# Patient Record
Sex: FEMALE | Race: BLACK OR AFRICAN AMERICAN | Employment: FULL TIME | URBAN - METROPOLITAN AREA
[De-identification: names, ages, dates, MRNs, and addresses within clinical notes are randomized per-mention and may not be internally consistent; named-entity substitution may affect disease eponyms.]

---

## 2018-08-29 ENCOUNTER — APPOINTMENT (OUTPATIENT)
Dept: CT IMAGING | Age: 43
End: 2018-08-29
Attending: EMERGENCY MEDICINE
Payer: OTHER GOVERNMENT

## 2018-08-29 ENCOUNTER — HOSPITAL ENCOUNTER (EMERGENCY)
Age: 43
Discharge: HOME | End: 2018-08-29
Attending: STUDENT IN AN ORGANIZED HEALTH CARE EDUCATION/TRAINING PROGRAM
Payer: OTHER GOVERNMENT

## 2018-08-29 ENCOUNTER — APPOINTMENT (OUTPATIENT)
Dept: GENERAL RADIOLOGY | Age: 43
End: 2018-08-29
Attending: EMERGENCY MEDICINE
Payer: OTHER GOVERNMENT

## 2018-08-29 VITALS
RESPIRATION RATE: 16 BRPM | BODY MASS INDEX: 28.51 KG/M2 | HEIGHT: 66 IN | OXYGEN SATURATION: 100 % | WEIGHT: 177.4 LBS | HEART RATE: 75 BPM | SYSTOLIC BLOOD PRESSURE: 117 MMHG | DIASTOLIC BLOOD PRESSURE: 84 MMHG | TEMPERATURE: 97.9 F

## 2018-08-29 DIAGNOSIS — R07.9 CHEST PAIN, UNSPECIFIED TYPE: Primary | ICD-10-CM

## 2018-08-29 LAB
ALBUMIN SERPL-MCNC: 3.7 G/DL (ref 3.5–5)
ALBUMIN/GLOB SERPL: 0.9 {RATIO} (ref 1.1–2.2)
ALP SERPL-CCNC: 47 U/L (ref 45–117)
ALT SERPL-CCNC: 13 U/L (ref 12–78)
ANION GAP SERPL CALC-SCNC: 10 MMOL/L (ref 5–15)
APPEARANCE UR: ABNORMAL
AST SERPL-CCNC: 10 U/L (ref 15–37)
BACTERIA URNS QL MICRO: ABNORMAL /HPF
BASOPHILS # BLD: 0 K/UL (ref 0–0.1)
BASOPHILS NFR BLD: 0 % (ref 0–1)
BILIRUB SERPL-MCNC: 1.5 MG/DL (ref 0.2–1)
BILIRUB UR QL CFM: NEGATIVE
BUN SERPL-MCNC: 13 MG/DL (ref 6–20)
BUN/CREAT SERPL: 16 (ref 12–20)
CALCIUM SERPL-MCNC: 8.8 MG/DL (ref 8.5–10.1)
CHLORIDE SERPL-SCNC: 103 MMOL/L (ref 97–108)
CO2 SERPL-SCNC: 27 MMOL/L (ref 21–32)
COLOR UR: ABNORMAL
COMMENT, HOLDF: NORMAL
CREAT SERPL-MCNC: 0.79 MG/DL (ref 0.55–1.02)
D DIMER PPP FEU-MCNC: 3.41 MG/L FEU (ref 0–0.65)
DIFFERENTIAL METHOD BLD: NORMAL
EOSINOPHIL # BLD: 0.1 K/UL (ref 0–0.4)
EOSINOPHIL NFR BLD: 1 % (ref 0–7)
EPITH CASTS URNS QL MICRO: ABNORMAL /LPF
ERYTHROCYTE [DISTWIDTH] IN BLOOD BY AUTOMATED COUNT: 12.3 % (ref 11.5–14.5)
GLOBULIN SER CALC-MCNC: 4.1 G/DL (ref 2–4)
GLUCOSE SERPL-MCNC: 101 MG/DL (ref 65–100)
GLUCOSE UR STRIP.AUTO-MCNC: NEGATIVE MG/DL
HCT VFR BLD AUTO: 40.4 % (ref 35–47)
HGB BLD-MCNC: 13.7 G/DL (ref 11.5–16)
HGB UR QL STRIP: NEGATIVE
IMM GRANULOCYTES # BLD: 0 K/UL (ref 0–0.04)
IMM GRANULOCYTES NFR BLD AUTO: 0 % (ref 0–0.5)
KETONES UR QL STRIP.AUTO: 15 MG/DL
LEUKOCYTE ESTERASE UR QL STRIP.AUTO: NEGATIVE
LIPASE SERPL-CCNC: 102 U/L (ref 73–393)
LYMPHOCYTES # BLD: 0.8 K/UL (ref 0.8–3.5)
LYMPHOCYTES NFR BLD: 18 % (ref 12–49)
MCH RBC QN AUTO: 32.4 PG (ref 26–34)
MCHC RBC AUTO-ENTMCNC: 33.9 G/DL (ref 30–36.5)
MCV RBC AUTO: 95.5 FL (ref 80–99)
MONOCYTES # BLD: 0.6 K/UL (ref 0–1)
MONOCYTES NFR BLD: 13 % (ref 5–13)
MUCOUS THREADS URNS QL MICRO: ABNORMAL /LPF
NEUTS SEG # BLD: 3 K/UL (ref 1.8–8)
NEUTS SEG NFR BLD: 67 % (ref 32–75)
NITRITE UR QL STRIP.AUTO: NEGATIVE
NRBC # BLD: 0 K/UL (ref 0–0.01)
NRBC BLD-RTO: 0 PER 100 WBC
PH UR STRIP: 5.5 [PH] (ref 5–8)
PLATELET # BLD AUTO: 230 K/UL (ref 150–400)
PMV BLD AUTO: 9.7 FL (ref 8.9–12.9)
POTASSIUM SERPL-SCNC: 3.2 MMOL/L (ref 3.5–5.1)
PROT SERPL-MCNC: 7.8 G/DL (ref 6.4–8.2)
PROT UR STRIP-MCNC: 30 MG/DL
RBC # BLD AUTO: 4.23 M/UL (ref 3.8–5.2)
RBC #/AREA URNS HPF: ABNORMAL /HPF (ref 0–5)
SAMPLES BEING HELD,HOLD: NORMAL
SODIUM SERPL-SCNC: 140 MMOL/L (ref 136–145)
SP GR UR REFRACTOMETRY: >1.03 (ref 1–1.03)
TROPONIN I SERPL-MCNC: <0.05 NG/ML
UR CULT HOLD, URHOLD: NORMAL
UROBILINOGEN UR QL STRIP.AUTO: 1 EU/DL (ref 0.2–1)
WBC # BLD AUTO: 4.5 K/UL (ref 3.6–11)
WBC URNS QL MICRO: ABNORMAL /HPF (ref 0–4)

## 2018-08-29 PROCEDURE — 80053 COMPREHEN METABOLIC PANEL: CPT | Performed by: NURSE PRACTITIONER

## 2018-08-29 PROCEDURE — 99284 EMERGENCY DEPT VISIT MOD MDM: CPT

## 2018-08-29 PROCEDURE — 84484 ASSAY OF TROPONIN QUANT: CPT | Performed by: NURSE PRACTITIONER

## 2018-08-29 PROCEDURE — 85025 COMPLETE CBC W/AUTO DIFF WBC: CPT | Performed by: NURSE PRACTITIONER

## 2018-08-29 PROCEDURE — 36415 COLL VENOUS BLD VENIPUNCTURE: CPT | Performed by: NURSE PRACTITIONER

## 2018-08-29 PROCEDURE — 71275 CT ANGIOGRAPHY CHEST: CPT

## 2018-08-29 PROCEDURE — 85379 FIBRIN DEGRADATION QUANT: CPT | Performed by: STUDENT IN AN ORGANIZED HEALTH CARE EDUCATION/TRAINING PROGRAM

## 2018-08-29 PROCEDURE — 81001 URINALYSIS AUTO W/SCOPE: CPT | Performed by: NURSE PRACTITIONER

## 2018-08-29 PROCEDURE — 93005 ELECTROCARDIOGRAM TRACING: CPT

## 2018-08-29 PROCEDURE — 74011000258 HC RX REV CODE- 258: Performed by: STUDENT IN AN ORGANIZED HEALTH CARE EDUCATION/TRAINING PROGRAM

## 2018-08-29 PROCEDURE — 74011636320 HC RX REV CODE- 636/320: Performed by: STUDENT IN AN ORGANIZED HEALTH CARE EDUCATION/TRAINING PROGRAM

## 2018-08-29 PROCEDURE — 71046 X-RAY EXAM CHEST 2 VIEWS: CPT

## 2018-08-29 PROCEDURE — 83690 ASSAY OF LIPASE: CPT | Performed by: NURSE PRACTITIONER

## 2018-08-29 RX ORDER — SODIUM CHLORIDE 0.9 % (FLUSH) 0.9 %
10 SYRINGE (ML) INJECTION
Status: COMPLETED | OUTPATIENT
Start: 2018-08-29 | End: 2018-08-29

## 2018-08-29 RX ADMIN — SODIUM CHLORIDE 100 ML: 900 INJECTION, SOLUTION INTRAVENOUS at 19:44

## 2018-08-29 RX ADMIN — Medication 10 ML: at 19:44

## 2018-08-29 RX ADMIN — IOPAMIDOL 100 ML: 755 INJECTION, SOLUTION INTRAVENOUS at 19:44

## 2018-08-29 NOTE — ED TRIAGE NOTES
Pt complains of chest pain described as a tightness that started today. States she took a vehicle ride to [place] and returned today. States \"it's hard to breath\" but denies shortness of breath.

## 2018-08-29 NOTE — BSMART NOTE
Was asked to see this pt today. Pt reports that she is very stressed out by her 13year old daughter who has been psychiatrically admitted on 3 occasions over the past one year. Pt states that she is constantly worried about her and feels like \"I walk on egg shells around her. \"  Pt works in [address] [place] [address] and lives with her mother so her daughter is often cared for by grandmother when pt is at work. Pt. States that her daughter suffers from depression and anxiety. Pt is  from daughter's father and he lives in [place]. Pt seemed to benefit from talking to this evaluator. Pt sees a therapist on Saturdays and likes this time to herself to \"vent. \"  Pt is not reporting any acute symptoms at this time. Pt will be discharged once medically cleared. Rigoberto Campos LCSW

## 2018-08-29 NOTE — ED NOTES
 
 
39year old female who presents with complaints of chest tightness since this afternoon after riding to [place] today. Pt reports difficulty taking a deep breath.

## 2018-08-29 NOTE — ED TRIAGE NOTES
When asked about depression for SAD scale pt becomes tearful and admits to depression but denies SI. Would like to speak to [facility].

## 2018-08-29 NOTE — ED PROVIDER NOTES
HPI Comments: 39 y.o. female with past medical history significant for MS who presents from work via private vehicle with chief complaint of chest pain. Pt reports sudden onset L sided nonradiating chest pain described as \"pressure, tightness, and heaviness\" while driving home from work in [place] at 1500 this afternoon with accompanying SOB. Pt states she currently still has the pain, unchanged with exertion or deep inspiration. Pt reports a history of MS, denies any increased myalgias from baseline. Pt denies any FMHx of early CAD/MI. Pt denies any recent procedures, denies birth control use. Pt denies any dizziness, lightheadedness, nausea, vomiting, or leg swelling. There are no other acute medical concerns at this time. Social hx: Nonsmoker; No EtOH use PCP: Naif Wasserman NP Note written by Giuseppe Chris, as dictated by Marie Alejo PA-C 5:58 PM 
 
The history is provided by the patient. No  was used. Past Medical History:  
Diagnosis Date  MS (multiple sclerosis) ([facility] [address] Past Surgical History:  
Procedure Laterality Date  HX ORTHOPAEDIC    
 bilat feet  HX PARTIAL HYSTERECTOMY History reviewed. No pertinent family history. Social History Social History  Marital status:  Spouse name: N/A  
 Number of children: N/A  
 Years of education: N/A Occupational History  Not on file. Social History Main Topics  Smoking status: Never Smoker  Smokeless tobacco: Never Used  Alcohol use No  
 Drug use: No  
 Sexual activity: Not on file Other Topics Concern  Not on file Social History Narrative ALLERGIES: Review of patient's allergies indicates no known allergies. Review of Systems Constitutional: Negative for chills and fever. Respiratory: Positive for shortness of breath. Cardiovascular: Positive for chest pain. Negative for leg swelling. Gastrointestinal: Negative for nausea and vomiting. Genitourinary: Negative for difficulty urinating and dysuria. Musculoskeletal: Negative for back pain and neck pain. Skin: Negative for color change and rash. Neurological: Negative for dizziness and light-headedness. All other systems reviewed and are negative. Vitals:  
 08/29/18 1645 BP: 112/73 Pulse: 93 Resp: 18 Temp: 97.6 °F (36.4 °C) SpO2: 97% Weight: 80.5 kg (177 lb 6.4 oz) Height: 5' 6\" (1.676 m) Physical Exam  
Constitutional: She is oriented to person, place, and time. She appears well-developed and well-nourished. No distress. HENT:  
Head: Normocephalic and atraumatic. Eyes: Conjunctivae and EOM are normal. Pupils are equal, round, and reactive to light. Neck: Normal range of motion. Neck supple. Cardiovascular: Normal rate, regular rhythm, S1 normal, S2 normal and intact distal pulses. No murmur heard. Pulses: 
     Radial pulses are 2+ on the right side, and 2+ on the left side. Posterior tibial pulses are 2+ on the right side, and 2+ on the left side. Pulmonary/Chest: Effort normal and breath sounds normal. No respiratory distress. She has no wheezes. She exhibits no tenderness. Abdominal: Soft. Normal aorta and bowel sounds are normal. She exhibits no distension, no abdominal bruit, no pulsatile midline mass and no mass. There is no hepatosplenomegaly, splenomegaly or hepatomegaly. There is no tenderness. There is no rebound and no guarding. No hernia. Hernia confirmed negative in the ventral area. Abdomen exposed for exam. No pain with light or deep palpation. Musculoskeletal: Normal range of motion. She exhibits no edema or tenderness. Calves nontender to palpation bilaterally. No cords, negative homans sign Lymphadenopathy:  
  She has no cervical adenopathy. Neurological: She is alert and oriented to person, place, and time.  No cranial nerve deficit. She exhibits normal muscle tone. Coordination normal.  
Skin: Skin is warm and dry. No rash noted. No erythema. Psychiatric: She has a normal mood and affect. Judgment and thought content normal.  
Nursing note and vitals reviewed. MDM Number of Diagnoses or Management Options Diagnosis management comments: 53-year-old female presenting for chest pain starting this afternoon. Sensation of needing to catch her breath but no shortness of breath. No nausea or vomiting. No family history of heart disease. Lungs are clear bilaterally. She is nontoxic appearing. No calf pain on exam. 
Plan: EKG, chest x-ray, labs, dispo based on results 7:45 PM 
Ddimer elevated. Discussed with patient. Will get CTA chest 
 
8:21 PM 
Pt reevaluated. Pt feeling better. . It is felt that the intermittent pain the patient was experiencing is consistent with non cardiac pain. There are no associated symptoms, nor is the pain exertional. The ECG is normal, as are the vital signs. The  Precautionary enzymes as well as CT were normal. The patient had a good response to aspirin this morning. I discussed my findings with the patient, including the uncertainties of any chest pain diagnosis. I gave strict return and follow up instructions Patient's results have been reviewed with them. Patient and/or family have verbally conveyed their understanding and agreement of the patient's signs, symptoms, diagnosis, treatment and prognosis and additionally agree to follow up as recommended or return to the Emergency Room should their condition change prior to follow-up. Discharge instructions have also been provided to the patient with some educational information regarding their diagnosis as well a list of reasons why they would want to return to the ER prior to their follow-up appointment should their condition change. Amount and/or Complexity of Data Reviewed Discuss the patient with other providers: yes (ER Attending-Flynn) Patient Progress Patient progress: stable ED Course Procedures 5:43 PM 
Attempted to see the ptJARED at bedside. ED EKG interpretation: 
Rhythm: normal sinus rhythm; and regular . Rate (approx.): 85; Axis: normal; ST/T wave: no acute st changes Pt case including HPI, PE, and all available lab and radiology results has been discussed with attending physician. Opportunity to evaluate patient has been provided to ER attending. Discharge and prescription plan has been agreed upon.

## 2018-08-30 LAB
ATRIAL RATE: 86 BPM
CALCULATED P AXIS, ECG09: 69 DEGREES
CALCULATED R AXIS, ECG10: 56 DEGREES
CALCULATED T AXIS, ECG11: 33 DEGREES
DIAGNOSIS, 93000: NORMAL
P-R INTERVAL, ECG05: 136 MS
Q-T INTERVAL, ECG07: 368 MS
QRS DURATION, ECG06: 92 MS
QTC CALCULATION (BEZET), ECG08: 440 MS
VENTRICULAR RATE, ECG03: 86 BPM

## 2018-08-30 NOTE — DISCHARGE INSTRUCTIONS
Chest Pain: Care Instructions  Your Care Instructions    There are many things that can cause chest pain. Some are not serious and will get better on their own in a few days. But some kinds of chest pain need more testing and treatment. Your doctor may have recommended a follow-up visit in the next 8 to 12 hours. If you are not getting better, you may need more tests or treatment. Even though your doctor has released you, you still need to watch for any problems. The doctor carefully checked you, but sometimes problems can develop later. If you have new symptoms or if your symptoms do not get better, get medical care right away. If you have worse or different chest pain or pressure that lasts more than 5 minutes or you passed out (lost consciousness), call 911 or seek other emergency help right away. A medical visit is only one step in your treatment. Even if you feel better, you still need to do what your doctor recommends, such as going to all suggested follow-up appointments and taking medicines exactly as directed. This will help you recover and help prevent future problems. How can you care for yourself at home? · Rest until you feel better. · Take your medicine exactly as prescribed. Call your doctor if you think you are having a problem with your medicine. · Do not drive after taking a prescription pain medicine. When should you call for help? Call 911 if:    · You passed out (lost consciousness).     · You have severe difficulty breathing.     · You have symptoms of a heart attack. These may include:  ¨ Chest pain or pressure, or a strange feeling in your chest.  ¨ Sweating. ¨ Shortness of breath. ¨ Nausea or vomiting. ¨ Pain, pressure, or a strange feeling in your back, neck, jaw, or upper belly or in one or both shoulders or arms. ¨ Lightheadedness or sudden weakness. ¨ A fast or irregular heartbeat.   After you call 911, the  may tell you to chew 1 adult-strength or 2 to 4 low-dose aspirin. Wait for an ambulance. Do not try to drive yourself.    Call your doctor today if:    · You have any trouble breathing.     · Your chest pain gets worse.     · You are dizzy or lightheaded, or you feel like you may faint.     · You are not getting better as expected.     · You are having new or different chest pain. Where can you learn more? Go to http://jr-coleman.info/. Enter A120 in the search box to learn more about \"Chest Pain: Care Instructions. \"  Current as of: November 20, 2017  Content Version: 11.7  © 2325-7385 [employer]. Care instructions adapted under license by [employer] (which disclaims liability or warranty for this information). If you have questions about a medical condition or this instruction, always ask your healthcare professional. [address] any warranty or liability for your use of this information.

## 2018-08-30 NOTE — ED NOTES
Discharge instructions given to pt by MD and RN . Pt has been given counseling on med use and verbalizes  understanding. Pt ambulated off unit with no signs of distress.

## 2019-04-22 ENCOUNTER — OFFICE VISIT (OUTPATIENT)
Dept: URGENT CARE | Age: 44
End: 2019-04-22

## 2019-04-22 VITALS
BODY MASS INDEX: 27.16 KG/M2 | HEART RATE: 91 BPM | HEIGHT: 66 IN | WEIGHT: 169 LBS | DIASTOLIC BLOOD PRESSURE: 59 MMHG | TEMPERATURE: 98.7 F | RESPIRATION RATE: 16 BRPM | OXYGEN SATURATION: 98 % | SYSTOLIC BLOOD PRESSURE: 120 MMHG

## 2019-04-22 DIAGNOSIS — R21 RASH AND NONSPECIFIC SKIN ERUPTION: Primary | ICD-10-CM

## 2019-04-22 RX ORDER — VALACYCLOVIR HYDROCHLORIDE 500 MG/1
500 TABLET, FILM COATED ORAL
COMMUNITY
Start: 2018-09-14 | End: 2019-04-22

## 2019-04-22 RX ORDER — PREDNISONE 10 MG/1
TABLET ORAL
Qty: 21 TAB | Refills: 0 | Status: SHIPPED | OUTPATIENT
Start: 2019-04-22 | End: 2019-07-08 | Stop reason: ALTCHOICE

## 2019-04-22 RX ORDER — VALACYCLOVIR HYDROCHLORIDE 1 G/1
1000 TABLET, FILM COATED ORAL 2 TIMES DAILY
Qty: 14 TAB | Refills: 0 | Status: SHIPPED | OUTPATIENT
Start: 2019-04-22 | End: 2019-07-08 | Stop reason: ALTCHOICE

## 2019-04-22 NOTE — PROGRESS NOTES
Rash    The history is provided by the patient. This is a new problem. The current episode started 12 to 24 hours ago. The problem has not changed since onset. The problem is associated with an unknown factor. There has been no fever. The rash is present on the neck (posterior on mid cervical spine area). The pain is moderate. The pain has been constant since onset. Associated symptoms include itching and pain. Risk factors: none. She has tried nothing for the symptoms. Past Medical History:   Diagnosis Date    MS (multiple sclerosis) ([facility] [address]         Past Surgical History:   Procedure Laterality Date    HX ORTHOPAEDIC      bilat feet    HX PARTIAL HYSTERECTOMY           History reviewed. No pertinent family history.      Social History     Socioeconomic History    Marital status:      Spouse name: Not on file    Number of children: Not on file    Years of education: Not on file    Highest education level: Not on file   Occupational History    Not on file   Social Needs    Financial resource strain: Not on file    Food insecurity:     Worry: Not on file     Inability: Not on file    Transportation needs:     Medical: Not on file     Non-medical: Not on file   Tobacco Use    Smoking status: Never Smoker    Smokeless tobacco: Never Used   Substance and Sexual Activity    Alcohol use: No    Drug use: No    Sexual activity: Not on file   Lifestyle    Physical activity:     Days per week: Not on file     Minutes per session: Not on file    Stress: Not on file   Relationships    Social connections:     Talks on phone: Not on file     Gets together: Not on file     Attends Jehovah's witness service: Not on file     Active member of club or organization: Not on file     Attends meetings of clubs or organizations: Not on file     Relationship status: Not on file    Intimate partner violence:     Fear of current or ex partner: Not on file     Emotionally abused: Not on file     Physically abused: Not on file     Forced sexual activity: Not on file   Other Topics Concern    Not on file   Social History Narrative    Not on file                ALLERGIES: Patient has no known allergies. Review of Systems   Skin: Positive for itching and rash. All other systems reviewed and are negative. Vitals:    04/22/19 1835   BP: 120/59   Pulse: 91   Resp: 16   Temp: 98.7 °F (37.1 °C)   SpO2: 98%   Weight: 169 lb (76.7 kg)   Height: 5' 6\" (1.676 m)       Physical Exam   Constitutional: No distress. Skin: Lesion (erythematous - painful and hypersensitive - lesions on cervical area) noted. Nursing note and vitals reviewed. MDM    Procedures        ICD-10-CM ICD-9-CM    1. Rash and nonspecific skin eruption R21 782.1     cervical spine area in midline- painful - local irritation and paresthesia- ? herpes zoster        Medications Ordered Today   Medications    valACYclovir (VALTREX) 1 gram tablet     Sig: Take 1 Tab by mouth two (2) times a day. Dispense:  14 Tab     Refill:  0    predniSONE (STERAPRED DS) 10 mg dose pack     Sig: As directed     Dispense:  21 Tab     Refill:  0     No results found for any visits on 04/22/19. The patients condition was discussed with the patient and they understand. The patient is to follow up with primary care doctor. If signs and symptoms become worse the pt is to go to the ER. The patient is to take medications as prescribed.

## 2019-04-22 NOTE — PATIENT INSTRUCTIONS

## 2019-04-23 ENCOUNTER — OFFICE VISIT (OUTPATIENT)
Dept: FAMILY MEDICINE CLINIC | Age: 44
End: 2019-04-23

## 2019-04-23 VITALS
WEIGHT: 167.1 LBS | TEMPERATURE: 98.8 F | HEIGHT: 66 IN | RESPIRATION RATE: 20 BRPM | HEART RATE: 77 BPM | DIASTOLIC BLOOD PRESSURE: 74 MMHG | BODY MASS INDEX: 26.86 KG/M2 | OXYGEN SATURATION: 98 % | SYSTOLIC BLOOD PRESSURE: 116 MMHG

## 2019-04-23 DIAGNOSIS — Z13.1 DIABETES MELLITUS SCREENING: ICD-10-CM

## 2019-04-23 DIAGNOSIS — R23.8 RASH, VESICULAR: ICD-10-CM

## 2019-04-23 DIAGNOSIS — G35 MULTIPLE SCLEROSIS (HCC): ICD-10-CM

## 2019-04-23 DIAGNOSIS — Z01.84 IMMUNITY STATUS TESTING: ICD-10-CM

## 2019-04-23 DIAGNOSIS — B02.9 HERPES ZOSTER WITHOUT COMPLICATION: Primary | ICD-10-CM

## 2019-04-23 DIAGNOSIS — E55.9 VITAMIN D DEFICIENCY: ICD-10-CM

## 2019-04-23 DIAGNOSIS — Z13.220 LIPID SCREENING: ICD-10-CM

## 2019-04-23 DIAGNOSIS — D50.9 IRON DEFICIENCY ANEMIA, UNSPECIFIED IRON DEFICIENCY ANEMIA TYPE: ICD-10-CM

## 2019-04-23 DIAGNOSIS — Z76.89 ENCOUNTER TO ESTABLISH CARE: ICD-10-CM

## 2019-04-23 DIAGNOSIS — R53.83 FATIGUE, UNSPECIFIED TYPE: ICD-10-CM

## 2019-04-23 NOTE — PATIENT INSTRUCTIONS
Shingles: Care Instructions Your Care Instructions Shingles (herpes zoster) causes pain and a blistered rash. The rash can appear anywhere on the body but will be on only one side of the body, the left or right. It will be in a band, a strip, or a small area. The pain can be very severe. Shingles can also cause tingling or itching in the area of the rash. The blisters scab over after a few days and heal in 2 to 4 weeks. Medicines can help you feel better and may help prevent more serious problems caused by shingles. Shingles is caused by the same virus that causes chickenpox. When you have chickenpox, the virus gets into your nerve roots and stays there (becomes dormant) long after you get over the chickenpox. If the virus becomes active again, it can cause shingles. Follow-up care is a key part of your treatment and safety. Be sure to make and go to all appointments, and call your doctor if you are having problems. It's also a good idea to know your test results and keep a list of the medicines you take. How can you care for yourself at home? · Be safe with medicines. Take your medicines exactly as prescribed. Call your doctor if you think you are having a problem with your medicine. Antiviral medicine helps you get better faster. · Try not to scratch or pick at the blisters. They will crust over and fall off on their own if you leave them alone. · Put cool, wet cloths on the area to relieve pain and itching. You can also use calamine lotion. Try not to use so much lotion that it cakes and is hard to get off. · Put cornstarch or baking soda on the sores to help dry them out so they heal faster. · Do not use thick ointment, such as petroleum jelly, on the sores. This will keep them from drying and healing. · To help remove loose crusts, soak them in tap water. This can help decrease oozing, and dry and soothe the skin.  
· Take an over-the-counter pain medicine, such as acetaminophen (Tylenol), ibuprofen (Advil, Motrin), or naproxen (Aleve). Read and follow all instructions on the label. · Avoid close contact with people until the blisters have healed. It is very important for you to avoid contact with anyone who has never had chickenpox or the chickenpox vaccine. Pregnant women, young babies, and anyone else who has a hard time fighting infection (such as someone with HIV, diabetes, or cancer) is especially at risk. When should you call for help? Call your doctor now or seek immediate medical care if: 
  · You have a new or higher fever.  
  · You have a severe headache and a stiff neck.  
  · You lose the ability to think clearly.  
  · The rash spreads to your forehead, nose, eyes, or eyelids.  
  · You have eye pain, or your vision gets worse.  
  · You have new pain in your face, or you cannot move the muscles in your face.  
  · Blisters spread to new parts of your body.  
 Watch closely for changes in your health, and be sure to contact your doctor if: 
  · The rash has not healed after 2 to 4 weeks.  
  · You still have pain after the rash has healed. Where can you learn more? Go to http://jrGIGAScoleman.info/. Too Drain in the search box to learn more about \"Shingles: Care Instructions. \" Current as of: July 30, 2018 Content Version: 11.9 © 4712-0091 [employer]. Care instructions adapted under license by [email or web address] (which disclaims liability or warranty for this information). If you have questions about a medical condition or this instruction, always ask your healthcare professional. [place] [address] any warranty or liability for your use of this information. Multiple Sclerosis (MS): Care Instructions Your Care Instructions Multiple sclerosis, also called MS, is a disease that can affect the brain, spinal cord, and nerves to the eyes.  MS can cause problems with muscle control and strength, vision, balance, feeling, and thinking. Whatever your symptoms are, taking medicine correctly and following your doctor's advice for home care can help you maintain your quality of life. Follow-up care is a key part of your treatment and safety. Be sure to make and go to all appointments, and call your doctor if you are having problems. It's also a good idea to know your test results and keep a list of the medicines you take. How can you care for yourself at home? General care · Take your medicines exactly as prescribed. Call your doctor if you think you are having a problem with your medicine. · Use a cane, walker, or scooter if your doctor suggests it. · Keep doing your normal activities as much as you can. · If you have problems urinating, press or tap your bladder area to help start urine flow. If you have trouble controlling your urine, plan your fluid intake and activities so that a toilet will be available when you need it. · Spend time with family and friends. Join a support group for people with MS if you want extra help. · Depression is common with this condition. Tell your doctor if you have trouble sleeping, are eating too much or are not hungry, or feel sad or tearful all the time. Depression can be treated with medicine and counseling. Diet and exercise · Eat a balanced diet. · If you have problems swallowing, change how and what you eat: ? Try thick drinks, such as milk shakes. They are easier to swallow than other fluids. ? Do not eat foods that crumble easily. These can cause choking. ? Use a  to prepare food. Soft foods need less chewing. ? Eat small meals often so that you do not get tired from eating larger meals. · Get exercise on most days. Work with your doctor to set up a program of walking, swimming, or other exercise that you are able to do.  A physical therapist can teach you exercises if you cannot walk but can move your limbs and trunk. Or you can do exercises to help with coordination and balance. You can help improve muscle stiffness by doing exercises while lying in certain positions. When should you call for help? Call your doctor now or seek immediate medical care if: 
  · You have a change in symptoms.  
  · You fall or have another injury.  
  · You have symptoms of a urinary infection. For example: ? You have blood or pus in your urine. ? You have pain in your back just below your rib cage. This is called flank pain. ? You have a fever, chills, or body aches. ? It hurts to urinate. ? You have groin or belly pain.  
 Watch closely for changes in your health, and be sure to contact your doctor if: 
  · You want more information about MS or medicines.  
  · You have questions about alternative treatments. Do not use any other treatments without talking to your doctor first.  
Where can you learn more? Go to http://jr-coleman.info/. Enter K680 in the search box to learn more about \"Multiple Sclerosis (MS): Care Instructions. \" Current as of: Serina 3, 2018 Content Version: 11.9 © 9145-0660 [employer]. Care instructions adapted under license by [employer] (which disclaims liability or warranty for this information). If you have questions about a medical condition or this instruction, always ask your healthcare professional. [address] any warranty or liability for your use of this information.

## 2019-04-23 NOTE — PROGRESS NOTES
Chief Complaint Patient presents with [facility] Establish Care Rm 4  
 Skin Problem HPI: 
Belkys Tilley is a 39y.o. year old female who is a new patient and is here to establish care. She was previously followed by no one recent, 3 years ago saw [facility] with [facility]. Rash: 
Seen in [facility] urgent care last night for rash on her posterior cervical region of her back. She reports that the rash started yesterday morning. It is itchy, stinging pain. She was prescribed Prednisone and Valtrex but has not started these yet, would like confirmatory testing. The following sections were reviewed & updated as appropriate: PMH, PSH, PL, FMH,  and SH. Past Medical History:  
Diagnosis Date  MS (multiple sclerosis) ([facility] [address] Past Surgical History:  
Procedure Laterality Date  HX HYSTERECTOMY  02/2016  
 left ovaries  HX ORTHOPAEDIC  2001, 2003  
 bilat feet, bunions removed, bones removed from pinky Patient Active Problem List  
Diagnosis Code  Multiple sclerosis ([facility] [address] G35  
 Herpes zoster without complication U61.5  Rash, vesicular R23.8 Family History Problem Relation Age of Onset  Hypertension Mother  Elevated Lipids Mother  Diabetes Paternal Grandmother  Diabetes Paternal Grandfather Social History Socioeconomic History  Marital status:  Spouse name: Not on file  Number of children: Not on file  Years of education: Not on file  Highest education level: Not on file Occupational History  Not on file Social Needs  Financial resource strain: Not on file  Food insecurity:  
  Worry: Not on file Inability: Not on file  Transportation needs:  
  Medical: Not on file Non-medical: Not on file Tobacco Use  Smoking status: Never Smoker  Smokeless tobacco: Never Used Substance and Sexual Activity  Alcohol use: No  
 Drug use: No  
 Sexual activity: Not Currently Lifestyle  Physical activity:  
  Days per week: Not on file Minutes per session: Not on file  Stress: Not on file Relationships  Social connections:  
  Talks on phone: Not on file Gets together: Not on file Attends Yarsanism service: Not on file Active member of club or organization: Not on file Attends meetings of clubs or organizations: Not on file Relationship status: Not on file  Intimate partner violence:  
  Fear of current or ex partner: Not on file Emotionally abused: Not on file Physically abused: Not on file Forced sexual activity: Not on file Other Topics Concern  Not on file Social History Narrative  Not on file Prior to Admission medications Medication Sig Start Date End Date Taking? Authorizing Provider  
valACYclovir (VALTREX) 1 gram tablet Take 1 Tab by mouth two (2) times a day. 4/22/19  Yes Geoffrey Hendrickson MD  
[facility]) 10 mg dose pack As directed 4/22/19  Yes Geoffrey Hendrickson MD  
  
 
No Known Allergies Review of Systems A comprehensive review of systems was negative except for that written in the HPI. Objective: 
 
  
Visit Vitals /74 (BP 1 Location: Right arm, BP Patient Position: Sitting) Pulse 77 Temp 98.8 °F (37.1 °C) (Oral) Resp 20 Ht 5' 6\" (1.676 m) Wt 167 lb 1.6 oz (75.8 kg) SpO2 98% BMI 26.97 kg/m² Physical Exam 
Gen: alert, oriented, no acute distress Head: normocephalic, atraumatic Ears: external auditory canals clear, TMs without erythema or effusion Eyes: pupils equal round reactive to light, sclera clear, conjunctiva clear Oral: moist mucus membranes, no oral lesions, no pharyngeal inflammation or exudate Neck: symmetric normal sized thyroid, no carotid bruits, no jugular vein distention Resp: no increase work of breathing, lungs clear to ausculation bilaterally, no wheezing, rales or rhonchi CV: S1, S2 normal.  No murmurs, rubs, or gallops. Abd: soft, not tender, not distended. No hepatosplenomegaly. Normal bowel sounds. No hernias. No abdominal or renal bruits. Neuro: cranial nerves intact, normal strength and movement in all extremities, reflexes and sensation intact and symmetric. Skin: posterior cervical rash as above Extremities: no cyanosis or edema Assessment & Plan: ICD-10-CM ICD-9-CM 1. Herpes zoster without complication V99.2 932.2 VZV AB, IGG  
   VZV AB, IGM 2. Multiple sclerosis ([facility] [address] G35 340   
3. Encounter to establish care Z76.89 V65.8 URINALYSIS W/ RFLX MICROSCOPIC  
   LIPID PANEL  
   METABOLIC PANEL, COMPREHENSIVE  
   TSH 3RD GENERATION  
   VITAMIN D, 25 HYDROXY  
   HEMOGLOBIN A1C WITH EAG  
   CBC WITH AUTOMATED DIFF 4. Immunity status testing Z01.84 V72.61 RUBEOLA AB, IGG  
5. Rash, vesicular R23.8 709.8 HSV 1/2 AB, IGM  
   VZV AB, IGG  
   VZV AB, IGM 6. Iron deficiency anemia, unspecified iron deficiency anemia type D50.9 280.9 CBC WITH AUTOMATED DIFF 7. Diabetes mellitus screening Z13.1 V77.1 URINALYSIS W/ RFLX MICROSCOPIC METABOLIC PANEL, COMPREHENSIVE  
   HEMOGLOBIN A1C WITH EAG  
8. Fatigue, unspecified type R53.83 780.79 URINALYSIS W/ RFLX MICROSCOPIC  
   LIPID PANEL  
   METABOLIC PANEL, COMPREHENSIVE  
   TSH 3RD GENERATION  
   VITAMIN D, 25 HYDROXY  
   HEMOGLOBIN A1C WITH EAG  
   CBC WITH AUTOMATED DIFF 9. Lipid screening Z13.220 V77.91 LIPID PANEL 10. Vitamin D deficiency E55.9 268.9 VITAMIN D, 25 HYDROXY Follow-up and Dispositions · Return in about 1 month (around 5/21/2019) for Medication Check, Labs. 
  
 
lab results and schedule of future lab studies reviewed with patient - patient will return for annual fasting labs prior to her next appointment, immunity/cause of herpetic rash labs will be done today 
reviewed diet, exercise and weight control 
reviewed medications and side effects in detail - patient advised to start prednisone and valtrex as prescribed at this time. Differential diagnosis and treatment options reviewed with patient who is in agreement with treatment plan as outlined below. Health Maintenance reviewed - reviewed, Tdap received at [facility] in 2015, otherwise UTD. Recommended healthy diet low in carbohydrates, fats, sodium and cholesterol. Recommended regular cardiovascular exercise 3-6 times per week for 30-60 minutes daily. Chart is reviewed and updated today in the office. Records requested for other providers patient has seen and is currently seeing.  (Prescription Monitoring Program) report was run and reviewed today in the office. Patient was offered a choice/choices in the treatment plan today. Patient expresses understanding of the plan and agrees with recommendations. Verbal and written instructions (see AVS) provided. See patient instructions for more. Patient expresses understanding of diagnosis and treatment plan.

## 2019-04-23 NOTE — PROGRESS NOTES
Martin Patel  Identified pt with two pt identifiers(name and ). Chief Complaint Patient presents with [address] [place] [address] Rm 4  
 Skin Problem 1. Have you been to the ER, urgent care clinic since your last visit? Y - [address]  Hospitalized since your last visit? N 
 
2. Have you seen or consulted any other health care providers outside of the [address] [place] [address] since your last visit? Include any pap smears or colon screening. Advance Care Planning In the event something were to happen to you and you were unable to speak on your behalf, do you have an Advance Directive/ Living Will in place stating your wishes? NO If yes, do we have a copy on file NO If no, would you like information YES Medication reconciliation up to date and corrected with patient at this time. Today's provider has been notified of reason for visit, vitals and flowsheets obtained on patients. Reviewed record in preparation for visit, huddled with provider and have obtained necessary documentation. Health Maintenance Due Topic  DTaP/Tdap/Td series (1 - Tdap)  PAP AKA CERVICAL CYTOLOGY Wt Readings from Last 3 Encounters:  
19 167 lb 1.6 oz (75.8 kg) 19 169 lb (76.7 kg) 18 177 lb 6.4 oz (80.5 kg) Temp Readings from Last 3 Encounters:  
19 98.8 °F (37.1 °C) (Oral) 19 98.7 °F (37.1 °C)  
18 97.9 °F (36.6 °C) BP Readings from Last 3 Encounters:  
19 116/74  
19 120/59  
18 117/84 Pulse Readings from Last 3 Encounters:  
19 77  
19 91  
18 75 Vitals:  
 19 1344 BP: 116/74 Pulse: 77 Resp: 20 Temp: 98.8 °F (37.1 °C) TempSrc: Oral  
SpO2: 98% Weight: 167 lb 1.6 oz (75.8 kg) Height: 5' 6\" (1.676 m) PainSc:   2 PainLoc: Back Learning Assessment: 
:  
 
Learning Assessment 2019 PRIMARY LEARNER Patient HIGHEST LEVEL OF EDUCATION - PRIMARY LEARNER  SOME COLLEGE  
BARRIERS PRIMARY LEARNER NONE  
CO-LEARNER CAREGIVER No  
PRIMARY LANGUAGE ENGLISH  
LEARNER PREFERENCE PRIMARY DEMONSTRATION  
ANSWERED BY patient RELATIONSHIP SELF Depression Screening: 
:  
 
3 most recent PHQ Screens 4/23/2019 Little interest or pleasure in doing things Not at all Feeling down, depressed, irritable, or hopeless Not at all Total Score PHQ 2 0 No flowsheet data found. Fall Risk Assessment: 
:  
 
No flowsheet data found. Abuse Screening: 
:  
 
No flowsheet data found. ADL Screening: 
:  
 
ADL Assessment 4/23/2019 Feeding yourself No Help Needed Getting from bed to chair No Help Needed Getting dressed No Help Needed Bathing or showering No Help Needed Walk across the room (includes cane/walker) No Help Needed Using the telphone No Help Needed Taking your medications No Help Needed Preparing meals No Help Needed Managing money (expenses/bills) No Help Needed Moderately strenuous housework (laundry) No Help Needed Shopping for personal items (toiletries/medicines) No Help Needed Shopping for groceries No Help Needed Driving No Help Needed Climbing a flight of stairs No Help Needed Getting to places beyond walking distances No Help Needed BMI: 
Weight Metrics 4/23/2019 4/22/2019 8/29/2018 6/4/2016 Weight 167 lb 1.6 oz 169 lb 177 lb 6.4 oz 191 lb 6.4 oz BMI 26.97 kg/m2 27.28 kg/m2 28.63 kg/m2 30.91 kg/m2 Medication reconciliation up to date and corrected with patient at this time.

## 2019-04-24 DIAGNOSIS — Z13.220 LIPID SCREENING: ICD-10-CM

## 2019-04-24 DIAGNOSIS — Z13.1 DIABETES MELLITUS SCREENING: ICD-10-CM

## 2019-04-24 DIAGNOSIS — Z76.89 ENCOUNTER TO ESTABLISH CARE: ICD-10-CM

## 2019-04-24 DIAGNOSIS — R53.83 FATIGUE, UNSPECIFIED TYPE: ICD-10-CM

## 2019-04-24 DIAGNOSIS — E55.9 VITAMIN D DEFICIENCY: ICD-10-CM

## 2019-04-24 DIAGNOSIS — D50.9 IRON DEFICIENCY ANEMIA, UNSPECIFIED IRON DEFICIENCY ANEMIA TYPE: ICD-10-CM

## 2019-04-25 LAB
HSV1+2 IGM SER IA-ACNC: 1.56 RATIO (ref 0–0.9)
MEV IGG SER IA-ACNC: <25 AU/ML
VZV IGG SER IA-ACNC: >4000 INDEX
VZV IGM SER IA-ACNC: 1.02 INDEX (ref 0–0.9)

## 2019-04-29 NOTE — PROGRESS NOTES
Blood tests confirm diagnosis of Shingles as cause of rash. Continue anti-vital medication as prescribed. If symptoms do not resolve please make a f/u appointment.

## 2019-04-30 NOTE — PROGRESS NOTES
Writer called patient with no answer. Mailbox full unable to leave message. Lab results to be given upon call back.

## 2019-05-17 ENCOUNTER — TELEPHONE (OUTPATIENT)
Dept: FAMILY MEDICINE CLINIC | Age: 44
End: 2019-05-17

## 2019-05-17 DIAGNOSIS — E55.9 VITAMIN D DEFICIENCY: ICD-10-CM

## 2019-05-17 DIAGNOSIS — Z76.89 ENCOUNTER TO ESTABLISH CARE: Primary | ICD-10-CM

## 2019-05-17 DIAGNOSIS — R53.83 FATIGUE, UNSPECIFIED TYPE: ICD-10-CM

## 2019-05-17 DIAGNOSIS — Z13.220 LIPID SCREENING: ICD-10-CM

## 2019-05-17 DIAGNOSIS — D50.9 IRON DEFICIENCY ANEMIA, UNSPECIFIED IRON DEFICIENCY ANEMIA TYPE: ICD-10-CM

## 2019-05-17 DIAGNOSIS — Z13.1 DIABETES MELLITUS SCREENING: ICD-10-CM

## 2019-05-17 NOTE — TELEPHONE ENCOUNTER
Patient called into the office to reschedule her physical appointment. Patient stated that she will be going to [facility] to complete labs instead of [facility] due to it being covered at 100%. Patient will need new lab orders to  to take to [facility]. Please call patient when ready to . [phone] or [phone].

## 2019-05-21 ENCOUNTER — TELEPHONE (OUTPATIENT)
Dept: FAMILY MEDICINE CLINIC | Age: 44
End: 2019-05-21

## 2019-05-22 DIAGNOSIS — Z13.220 LIPID SCREENING: ICD-10-CM

## 2019-05-22 DIAGNOSIS — Z13.1 DIABETES MELLITUS SCREENING: ICD-10-CM

## 2019-05-22 DIAGNOSIS — Z76.89 ENCOUNTER TO ESTABLISH CARE: ICD-10-CM

## 2019-05-22 DIAGNOSIS — E55.9 VITAMIN D DEFICIENCY: ICD-10-CM

## 2019-05-22 DIAGNOSIS — R53.83 FATIGUE, UNSPECIFIED TYPE: ICD-10-CM

## 2019-05-22 DIAGNOSIS — D50.9 IRON DEFICIENCY ANEMIA, UNSPECIFIED IRON DEFICIENCY ANEMIA TYPE: ICD-10-CM

## 2019-06-20 LAB
25(OH)D3 SERPL-MCNC: 19 NG/ML (ref 30–100)
ABSOLUTE BANDS, 67058: ABNORMAL
ABSOLUTE BLASTS: ABNORMAL
ABSOLUTE METAMYELOCYTES, 900360: ABNORMAL
ABSOLUTE MYELOCYTES: ABNORMAL
ABSOLUTE NRBC,ANRBC: ABNORMAL
ABSOLUTE PROMYELOCYTES: ABNORMAL
ALB/GLOBRATIO, 58C: 1.5 (CALC) (ref 1–2.5)
ALBUMIN SERPL-MCNC: 3.9 G/DL (ref 3.6–5.1)
ALP SERPL-CCNC: 41 U/L (ref 33–115)
ALT SERPL-CCNC: 7 U/L (ref 6–29)
AMORPHOUS SEDIMENT: NORMAL
APPEARANCE UR: CLEAR
AST SERPL W P-5'-P-CCNC: 12 U/L (ref 10–30)
BACTERIA,BACTU: NORMAL
BANDS,BANDS: ABNORMAL
BASOPHILS # BLD: 31 CELLS/UL (ref 0–200)
BASOPHILS NFR BLD: 1 %
BILIRUB SERPL-MCNC: 1 MG/DL (ref 0.2–1.2)
BILIRUB UR QL: NEGATIVE
BILIRUB UR QL: NEGATIVE
BLASTS,BLAST: ABNORMAL
BUN SERPL-MCNC: 10 MG/DL (ref 7–25)
BUN/CREATININE RATIO,BUCR: NORMAL (CALC) (ref 6–22)
CA OXALATE CRYSTALS,CAOXC: NORMAL
CALCIUM SERPL-MCNC: 9 MG/DL (ref 8.6–10.2)
CASTS,URINE,CSTS: NORMAL
CHLORIDE SERPL-SCNC: 104 MMOL/L (ref 98–110)
CHOL/HDL RATIO,CHHDX: 2.5 (CALC)
CHOLEST SERPL-MCNC: 152 MG/DL
CO2 SERPL-SCNC: 31 MMOL/L (ref 20–32)
COLOR UR: YELLOW
COMMENT(S): ABNORMAL
COMMENT, 35578784: NORMAL
CREAT SERPL-MCNC: 0.74 MG/DL (ref 0.5–1.1)
CRYSTALS,UCRY: NORMAL
EAG (MG/DL),9916804: 108 (CALC)
EAG (MMOL/L),9916805: 6 (CALC)
EOSINOPHIL # BLD: 40 CELLS/UL (ref 15–500)
EOSINOPHIL NFR BLD: 1.3 %
ERYTHROCYTE [DISTWIDTH] IN BLOOD BY AUTOMATED COUNT: 12.8 % (ref 11–15)
GLOBULIN,GLOB: 2.6 G/DL (CALC) (ref 1.9–3.7)
GLUCOSE SERPL-MCNC: 85 MG/DL (ref 65–99)
GRANULAR CAST,GRCST: NORMAL
HBA1C MFR BLD HPLC: 5.4 % OF TOTAL HGB
HCT VFR BLD AUTO: 38.6 % (ref 35–45)
HDLC SERPL-MCNC: 60 MG/DL
HGB BLD-MCNC: 13.3 G/DL (ref 11.7–15.5)
HGB UR QL STRIP: NEGATIVE
HYALINE CAST,HYCST: NORMAL
KETONES UR QL STRIP.AUTO: NEGATIVE
LDL-CHOLESTEROL: 82 MG/DL (CALC)
LEUKOCYTE ESTERASE: NEGATIVE
LYMPHOCYTES # BLD: 698 CELLS/UL (ref 850–3900)
LYMPHOCYTES NFR BLD: 22.5 %
MCH RBC QN AUTO: 32.5 PG (ref 27–33)
MCHC RBC AUTO-ENTMCNC: 34.5 G/DL (ref 32–36)
MCV RBC AUTO: 94.4 FL (ref 80–100)
METAMYELOCYTES,METAS: ABNORMAL
MONOCYTES # BLD: 375 CELLS/UL (ref 200–950)
MONOCYTES NFR BLD: 12.1 %
MYELOCYTES,MYELO: ABNORMAL
NEUTROPHILS # BLD AUTO: 1956 CELLS/UL (ref 1500–7800)
NEUTROPHILS # BLD: 63.1 %
NITRITE UR QL STRIP.AUTO: NEGATIVE
NON-HDL CHOLESTEROL, 011976: 92 MG/DL (CALC)
NRBC: ABNORMAL
PH UR STRIP: 7 [PH] (ref 5–8)
PLATELET # BLD AUTO: 230 THOUSAND/UL (ref 140–400)
PMV BLD AUTO: 10.6 FL (ref 7.5–12.5)
POTASSIUM SERPL-SCNC: 4 MMOL/L (ref 3.5–5.3)
PROMYELOCYTES,PRO: ABNORMAL
PROT SERPL-MCNC: 6.5 G/DL (ref 6.1–8.1)
PROT UR STRIP-MCNC: NEGATIVE MG/DL
RBC # BLD AUTO: 4.09 MILLION/UL (ref 3.8–5.1)
RBC #/AREA URNS HPF: NORMAL /[HPF]
REACTIVE LYMPHS: ABNORMAL
REDUCING SUBSTANCES: NORMAL
RENAL EPITHELIAL CELLS, 6131: NORMAL
SODIUM SERPL-SCNC: 138 MMOL/L (ref 135–146)
SP GR UR REFRACTOMETRY: 1.02 (ref 1–1.03)
SQUAMOUS EPITHELIAL CELLS: NORMAL
TRANSITIONAL EPITHELIAL CELLS, 6132: NORMAL
TRIGL SERPL-MCNC: 34 MG/DL (ref ?–150)
TRIPLE PHOS. CRYSTAL,TRIPC: NORMAL
TSH SERPL DL<=0.005 MIU/L-ACNC: 1.12 MIU/L
URATE CRY URNS QL MICRO: NORMAL
WBC # BLD AUTO: 3.1 THOUSAND/UL (ref 3.8–10.8)
WBC URNS QL MICRO: NORMAL
YEAST URINE, 5174: NORMAL

## 2019-06-24 DIAGNOSIS — E55.9 VITAMIN D DEFICIENCY: Primary | ICD-10-CM

## 2019-06-24 RX ORDER — ERGOCALCIFEROL 1.25 MG/1
50000 CAPSULE ORAL
Qty: 5 CAP | Refills: 2 | Status: SHIPPED | OUTPATIENT
Start: 2019-06-24 | End: 2019-10-14 | Stop reason: SDUPTHER

## 2019-06-24 NOTE — PROGRESS NOTES
Urine Test:  Your urine analysis is normal.    Cholesterol Panel: Excellent! Total Cholesterol is 152 (goal <200). Triglycerides are 60.  (goal <150)  Your HDL or \"good\" cholesterol is 34. (goal >40). Your LDL or \"bad\" cholesterol is 92.  (goal <100). CMP:  Fasting blood sugar is normal at 85, your hemoglobin A1C is 5.4. You do not have diabetes. Kidney function is normal.   Your electrolytes are normal.   Your liver function is normal.     Thyroid:  Your thyroid function is normal.    Vitamin D:  Your vitamin D is low at 19. This is an important nutrient bone health and to fight inflammation and infection. We will start a prescription to increase your Vitamin D level for the next 3 months. You will take 50,000 international units Vitamin D2 in the form of one tab taken once weekly, I have sent this prescription to your pharmacy on file. After you complete the prescription, start taking OTC Vitamin D3 2,000 international units daily. After three months we will recheck your levels. A normal value is between 40-50 on average. The symptoms of vitamin D deficiency are sometimes vague and can include tiredness and general aches and pains. Some people may not have any symptoms at all. Vitamin D also fights infections, including colds and the flu, as it regulates the expression of genes that influence your immune system to attack and destroy bacteria and viruses. CBC:  Your red blood cell count is slightly low. Your white blood cell count is normal.   Your platelet count is normal.   You are not anemic and your hemoglobin is 13.3. Anayeli Zuleta, MSN, MHA, FNP-BC

## 2019-06-25 NOTE — PROGRESS NOTES
Spoke with patient after obtaining 2 patient identifiers  Patient made aware of lab results with no questions noted.  Directions given on how to take vit D.

## 2019-07-08 ENCOUNTER — OFFICE VISIT (OUTPATIENT)
Dept: FAMILY MEDICINE CLINIC | Age: 44
End: 2019-07-08

## 2019-07-08 VITALS
OXYGEN SATURATION: 98 % | SYSTOLIC BLOOD PRESSURE: 91 MMHG | BODY MASS INDEX: 26.79 KG/M2 | DIASTOLIC BLOOD PRESSURE: 62 MMHG | HEART RATE: 76 BPM | TEMPERATURE: 98.3 F | WEIGHT: 170.7 LBS | RESPIRATION RATE: 18 BRPM | HEIGHT: 67 IN

## 2019-07-08 NOTE — PROGRESS NOTES
Eladia Cody  Identified pt with two pt identifiers(name and ). Chief Complaint   Patient presents with    Physical     Rm 5         1. Have you been to the ER, urgent care clinic since your last visit? Hospitalized since your last visit? NO    2. Have you seen or consulted any other health care providers outside of the [address] [place] [address] since your last visit? Include any pap smears or colon screening. YES      My Chart     My chart gives you direct online access to portions of the electronic medical record (EMR) where your doctor stores your health information (ie, lab results, appointment information, medications, immunizations, and more. It is free. Would you like to set up your my chart? NO    [unfilled]    Weight Metrics 2019   Weight 170 lb 11.2 oz 167 lb 1.6 oz 169 lb 177 lb 6.4 oz 191 lb 6.4 oz   BMI 27.14 kg/m2 26.97 kg/m2 27.28 kg/m2 28.63 kg/m2 30.91 kg/m2           Medication reconciliation up to date and corrected with patient at this time. Advance Care Planning    In the event something were to happen to you and you were unable to speak on your behalf, do you have an Advance Directive/ Living Will in place stating your wishes? YES    If yes, do we have a copy on file NO    If no, would you like information NO      ====Mary Mcmullen Invitation====    Patient was invited to [facility] on this date and given the information folder for review. Recommended appointment with Mary Mcmullen facilitator for ACP conversation regarding advance directives. [x] Yes  [] No  Referral sent to [facility] team member or Coordinator for follow-up    [] Yes  [x] No  Patient scheduled an appointment. Site of Referral:       Today's provider has been notified of reason for visit, vitals and flowsheets obtained on patients.      Reviewed record in preparation for visit, huddled with provider and have obtained necessary documentation.       Health Maintenance Due   Topic    DTaP/Tdap/Td series (1 - Tdap)       Wt Readings from Last 3 Encounters:   07/08/19 170 lb 11.2 oz (77.4 kg)   04/23/19 167 lb 1.6 oz (75.8 kg)   04/22/19 169 lb (76.7 kg)     Temp Readings from Last 3 Encounters:   07/08/19 98.3 °F (36.8 °C) (Oral)   04/23/19 98.8 °F (37.1 °C) (Oral)   04/22/19 98.7 °F (37.1 °C)     BP Readings from Last 3 Encounters:   07/08/19 91/62   04/23/19 116/74   04/22/19 120/59     Pulse Readings from Last 3 Encounters:   07/08/19 76   04/23/19 77   04/22/19 91     Vitals:    07/08/19 1402   BP: 91/62   Pulse: 76   Resp: 18   Temp: 98.3 °F (36.8 °C)   TempSrc: Oral   SpO2: 98%   Weight: 170 lb 11.2 oz (77.4 kg)   Height: 5' 6.5\" (1.689 m)         Learning Assessment:  :     Learning Assessment 7/8/2019 4/23/2019   PRIMARY LEARNER Patient Patient   HIGHEST LEVEL OF EDUCATION - PRIMARY LEARNER  SOME COLLEGE SOME COLLEGE   BARRIERS PRIMARY LEARNER NONE NONE   CO-LEARNER CAREGIVER - No   PRIMARY LANGUAGE ENGLISH ENGLISH   LEARNER PREFERENCE PRIMARY DEMONSTRATION DEMONSTRATION   ANSWERED BY patient patient   RELATIONSHIP SELF SELF       Depression Screening:  :     3 most recent PHQ Screens 7/8/2019   Little interest or pleasure in doing things Several days   Feeling down, depressed, irritable, or hopeless Several days   Total Score PHQ 2 2   Trouble falling or staying asleep, or sleeping too much Several days   Feeling tired or having little energy Several days   Poor appetite, weight loss, or overeating Several days   Feeling bad about yourself - or that you are a failure or have let yourself or your family down Several days   Trouble concentrating on things such as school, work, reading, or watching TV Not at all   Moving or speaking so slowly that other people could have noticed; or the opposite being so fidgety that others notice Not at all   Thoughts of being better off dead, or hurting yourself in some way Several days   PHQ 9 Score 7   How difficult have these problems made it for you to do your work, take care of your home and get along with others Not difficult at all       Fall Risk Assessment:  :     Fall Risk Assessment, last 12 mths 7/8/2019   Able to walk? Yes   Fall in past 12 months? No       Abuse Screening:  :     Abuse Screening Questionnaire 7/8/2019   Do you ever feel afraid of your partner? N   Are you in a relationship with someone who physically or mentally threatens you? N   Is it safe for you to go home?  Y       ADL Screening:  :     ADL Assessment 7/8/2019   Feeding yourself No Help Needed   Getting from bed to chair No Help Needed   Getting dressed No Help Needed   Bathing or showering No Help Needed   Walk across the room (includes cane/walker) No Help Needed   Using the telphone No Help Needed   Taking your medications No Help Needed   Preparing meals No Help Needed   Managing money (expenses/bills) No Help Needed   Moderately strenuous housework (laundry) No Help Needed   Shopping for personal items (toiletries/medicines) No Help Needed   Shopping for groceries No Help Needed   Driving No Help Needed   Climbing a flight of stairs No Help Needed   Getting to places beyond walking distances No Help Needed

## 2019-10-14 ENCOUNTER — OFFICE VISIT (OUTPATIENT)
Dept: FAMILY MEDICINE CLINIC | Age: 44
End: 2019-10-14

## 2019-10-14 VITALS
HEIGHT: 67 IN | TEMPERATURE: 97.8 F | HEART RATE: 80 BPM | RESPIRATION RATE: 19 BRPM | SYSTOLIC BLOOD PRESSURE: 123 MMHG | OXYGEN SATURATION: 99 % | DIASTOLIC BLOOD PRESSURE: 64 MMHG | WEIGHT: 173.6 LBS | BODY MASS INDEX: 27.25 KG/M2

## 2019-10-14 DIAGNOSIS — H46.9 OPTIC NEURITIS: ICD-10-CM

## 2019-10-14 DIAGNOSIS — Z86.19 HISTORY OF SHINGLES: ICD-10-CM

## 2019-10-14 DIAGNOSIS — E55.9 VITAMIN D DEFICIENCY: ICD-10-CM

## 2019-10-14 DIAGNOSIS — G35 MULTIPLE SCLEROSIS (HCC): ICD-10-CM

## 2019-10-14 DIAGNOSIS — Z00.00 ANNUAL PHYSICAL EXAM: Primary | ICD-10-CM

## 2019-10-14 DIAGNOSIS — Z12.39 SCREENING FOR BREAST CANCER: ICD-10-CM

## 2019-10-14 PROBLEM — R23.8 RASH, VESICULAR: Status: RESOLVED | Noted: 2019-04-23 | Resolved: 2019-10-14

## 2019-10-14 PROBLEM — B02.9 HERPES ZOSTER WITHOUT COMPLICATION: Status: RESOLVED | Noted: 2019-04-23 | Resolved: 2019-10-14

## 2019-10-14 RX ORDER — ERGOCALCIFEROL 1.25 MG/1
50000 CAPSULE ORAL
Qty: 15 CAP | Refills: 1 | Status: SHIPPED | OUTPATIENT
Start: 2019-10-14 | End: 2022-05-23 | Stop reason: SDUPTHER

## 2019-10-14 RX ORDER — IBUPROFEN 200 MG
TABLET ORAL
COMMUNITY
Start: 2011-05-10 | End: 2022-05-09 | Stop reason: DRUGHIGH

## 2019-10-14 NOTE — PROGRESS NOTES
Asia Tolentino  Identified pt with two pt identifiers(name and ). Chief Complaint   Patient presents with    Physical     Room 4    Labs     discuss       1. Have you been to the ER, urgent care clinic since your last visit? n  Hospitalized since your last visit? n     2. Have you seen or consulted any other health care providers outside of the [address] [place] [address] since your last visit? Include any pap smears or colon screening. n       Advance Care Planning    In the event something were to happen to you and you were unable to speak on your behalf, do you have an Advance Directive/ Living Will in place stating your wishes? NO    If yes, do we have a copy on file NO    If no, would you like information NO    Medication reconciliation up to date and corrected with patient at this time. Today's provider has been notified of reason for visit, vitals and flowsheets obtained on patients. Reviewed record in preparation for visit, huddled with provider and have obtained necessary documentation.       Health Maintenance Due   Topic    DTaP/Tdap/Td series (1 - Tdap)    Influenza Age 5 to Adult        Wt Readings from Last 3 Encounters:   10/14/19 173 lb 9.6 oz (78.7 kg)   19 170 lb 11.2 oz (77.4 kg)   19 167 lb 1.6 oz (75.8 kg)     Temp Readings from Last 3 Encounters:   10/14/19 97.8 °F (36.6 °C) (Oral)   19 98.3 °F (36.8 °C) (Oral)   19 98.8 °F (37.1 °C) (Oral)     BP Readings from Last 3 Encounters:   10/14/19 123/64   19 91/62   19 116/74     Pulse Readings from Last 3 Encounters:   10/14/19 80   19 76   19 77     Vitals:    10/14/19 0849   BP: 123/64   Pulse: 80   Resp: 19   Temp: 97.8 °F (36.6 °C)   TempSrc: Oral   SpO2: 99%   Weight: 173 lb 9.6 oz (78.7 kg)   Height: 5' 6.5\" (1.689 m)   PainSc:   0 - No pain         Learning Assessment:  :     Learning Assessment 2019   PRIMARY LEARNER Patient Patient   HIGHEST LEVEL OF EDUCATION - PRIMARY LEARNER  SOME COLLEGE SOME COLLEGE   BARRIERS PRIMARY LEARNER NONE NONE   CO-LEARNER CAREGIVER - No   PRIMARY LANGUAGE ENGLISH ENGLISH   LEARNER PREFERENCE PRIMARY DEMONSTRATION DEMONSTRATION   ANSWERED BY patient patient   RELATIONSHIP SELF SELF       Depression Screening:  :     3 most recent PHQ Screens 10/14/2019   Little interest or pleasure in doing things Not at all   Feeling down, depressed, irritable, or hopeless Not at all   Total Score PHQ 2 0   Trouble falling or staying asleep, or sleeping too much -   Feeling tired or having little energy -   Poor appetite, weight loss, or overeating -   Feeling bad about yourself - or that you are a failure or have let yourself or your family down -   Trouble concentrating on things such as school, work, reading, or watching TV -   Moving or speaking so slowly that other people could have noticed; or the opposite being so fidgety that others notice -   Thoughts of being better off dead, or hurting yourself in some way -   PHQ 9 Score -   How difficult have these problems made it for you to do your work, take care of your home and get along with others -       No flowsheet data found. Fall Risk Assessment:  :     Fall Risk Assessment, last 12 mths 7/8/2019   Able to walk? Yes   Fall in past 12 months? No       Abuse Screening:  :     Abuse Screening Questionnaire 7/8/2019   Do you ever feel afraid of your partner? N   Are you in a relationship with someone who physically or mentally threatens you? N   Is it safe for you to go home?  Y       ADL Screening:  :     ADL Assessment 7/8/2019   Feeding yourself No Help Needed   Getting from bed to chair No Help Needed   Getting dressed No Help Needed   Bathing or showering No Help Needed   Walk across the room (includes cane/walker) No Help Needed   Using the telphone No Help Needed   Taking your medications No Help Needed   Preparing meals No Help Needed   Managing money (expenses/bills) No Help Needed Moderately strenuous housework (laundry) No Help Needed   Shopping for personal items (toiletries/medicines) No Help Needed   Shopping for groceries No Help Needed   Driving No Help Needed   Climbing a flight of stairs No Help Needed   Getting to places beyond walking distances No Help Needed           BMI:  Weight Metrics 10/14/2019 7/8/2019 4/23/2019 4/22/2019 8/29/2018 6/4/2016   Weight 173 lb 9.6 oz 170 lb 11.2 oz 167 lb 1.6 oz 169 lb 177 lb 6.4 oz 191 lb 6.4 oz   BMI 27.6 kg/m2 27.14 kg/m2 26.97 kg/m2 27.28 kg/m2 28.63 kg/m2 30.91 kg/m2           Medication reconciliation up to date and corrected with patient at this time.

## 2019-10-14 NOTE — PATIENT INSTRUCTIONS
Well Visit, Ages 25 to 48: Care Instructions  Your Care Instructions    Physical exams can help you stay healthy. Your doctor has checked your overall health and may have suggested ways to take good care of yourself. He or she also may have recommended tests. At home, you can help prevent illness with healthy eating, regular exercise, and other steps. Follow-up care is a key part of your treatment and safety. Be sure to make and go to all appointments, and call your doctor if you are having problems. It's also a good idea to know your test results and keep a list of the medicines you take. How can you care for yourself at home? · Reach and stay at a healthy weight. This will lower your risk for many problems, such as obesity, diabetes, heart disease, and high blood pressure. · Get at least 30 minutes of physical activity on most days of the week. Walking is a good choice. You also may want to do other activities, such as running, swimming, cycling, or playing tennis or team sports. Discuss any changes in your exercise program with your doctor. · Do not smoke or allow others to smoke around you. If you need help quitting, talk to your doctor about stop-smoking programs and medicines. These can increase your chances of quitting for good. · Talk to your doctor about whether you have any risk factors for sexually transmitted infections (STIs). Having one sex partner (who does not have STIs and does not have sex with anyone else) is a good way to avoid these infections. · Use birth control if you do not want to have children at this time. Talk with your doctor about the choices available and what might be best for you. · Protect your skin from too much sun. When you're outdoors from 10 a.m. to 4 p.m., stay in the shade or cover up with clothing and a hat with a wide brim. Wear sunglasses that block UV rays. Even when it's cloudy, put broad-spectrum sunscreen (SPF 30 or higher) on any exposed skin.   · See a dentist one or two times a year for checkups and to have your teeth cleaned. · Wear a seat belt in the car. Follow your doctor's advice about when to have certain tests. These tests can spot problems early. For everyone  · Cholesterol. Have the fat (cholesterol) in your blood tested after age 21. Your doctor will tell you how often to have this done based on your age, family history, or other things that can increase your risk for heart disease. · Blood pressure. Have your blood pressure checked during a routine doctor visit. Your doctor will tell you how often to check your blood pressure based on your age, your blood pressure results, and other factors. · Vision. Talk with your doctor about how often to have a glaucoma test.  · Diabetes. Ask your doctor whether you should have tests for diabetes. · Colon cancer. Your risk for colorectal cancer gets higher as you get older. Some experts say that adults should start regular screening at age 48 and stop at age 76. Others say to start before age 48 or continue after age 76. Talk with your doctor about your risk and when to start and stop screening. For women  · Breast exam and mammogram. Talk to your doctor about when you should have a clinical breast exam and a mammogram. Medical experts differ on whether and how often women under 50 should have these tests. Your doctor can help you decide what is right for you. · Cervical cancer screening test and pelvic exam. Begin with a Pap test at age 24. The test often is part of a pelvic exam. Starting at age 27, you may choose to have a Pap test, an HPV test, or both tests at the same time (called co-testing). Talk with your doctor about how often to have testing. · Tests for sexually transmitted infections (STIs). Ask whether you should have tests for STIs. You may be at risk if you have sex with more than one person, especially if your partners do not wear condoms.   For men  · Tests for sexually transmitted infections (STIs). Ask whether you should have tests for STIs. You may be at risk if you have sex with more than one person, especially if you do not wear a condom. · Testicular cancer exam. Ask your doctor whether you should check your testicles regularly. · Prostate exam. Talk to your doctor about whether you should have a blood test (called a PSA test) for prostate cancer. Experts differ on whether and when men should have this test. Some experts suggest it if you are older than 39 and are -American or have a father or brother who got prostate cancer when he was younger than 72. When should you call for help? Watch closely for changes in your health, and be sure to contact your doctor if you have any problems or symptoms that concern you. Where can you learn more? Go to http://jr-coleman.info/. Enter P072 in the search box to learn more about \"Well Visit, Ages 25 to 48: Care Instructions. \"  Current as of: December 13, 2018  Content Version: 12.2  © 0036-6570 [employer]. Care instructions adapted under license by [employer] (which disclaims liability or warranty for this information). If you have questions about a medical condition or this instruction, always ask your healthcare professional. [place] [address] any warranty or liability for your use of this information. Learning About Breast Cancer Screening  What is breast cancer screening? Breast cancer occurs when cells that are not normal grow in one or both of your breasts. Screening tests can help find breast cancer early. Cancer is easier to treat when it's found early. Having concerns about breast cancer is common. That's why it's important to talk with your doctor about when to start and how often to get screened for breast cancer. How is breast cancer screening done? Several screening tests can be used to check for breast cancer.   · Mammograms check for signs of cancer using X-rays. They can show tumors that are too small for you or your doctor to feel. During a mammogram, a machine squeezes your breasts to make them flatter and easier to X-ray. At least two pictures are taken of each breast. One is taken from the top and one from the side. · 3-D mammograms are also called digital breast tomosynthesis. Your breast is positioned on a flat plate. A top plate is pressed against your breast to keep it in position. The X-ray arm then moves in an arc above the breast and takes many pictures. A computer uses these X-rays to create a three-dimensional image. · Clinical breast exams are a doctor's exam. Your doctor carefully feels your breasts and under your arms to check for lumps or other changes. After the screening, your doctor will tell you the results. You will also be told if you need any follow-up tests. When should you get screened? Talk with your doctor about when you should start being tested for breast cancer. How often you get tested and the kind of tests you get will depend on your age and your risk. The guidelines that follow are for women who have an average risk for breast cancer. If you have a higher risk for breast cancer, such as having a family history of breast cancer in multiple relatives or at a young age, your doctor may recommend different screening for you. · Ages 21 to 44: Some experts recommend that women have a clinical breast exam every 3 years, starting at age 21. Ask your doctor how often you should have this test. If you have a high risk for breast cancer, talk with your doctor about when to start yearly mammograms and other screening tests. · Ages 36 and older: Talk with your doctor about how often you should have mammograms and clinical breast exams. What is your risk for breast cancer? If you don't already know your risk of breast cancer, you can ask your doctor about it. You can also look it up at www.cancer.gov/bcrisktool/.   If your doctor says that you have a high or very high risk, ask about ways to reduce your risk. These could include getting extra screening, taking medicine, or having surgery. If you have a strong family history of breast cancer, ask your doctor about genetic testing. What steps can you take to stay healthy? Some things that increase your risk of breast cancer, such as your age and being female, cannot be controlled. But you can do some things to stay as healthy as you can. · Learn what your breasts normally look and feel like. If you notice any changes, tell your doctor. · If you drink alcohol, limit how much you drink. Any amount of alcohol may increase your risk for some types of cancer. · If you smoke, quit. When you quit smoking, you lower your chances of getting many types of cancer. You can also do your best to eat well, be active, and stay at a healthy weight. Eating healthy foods and being active every day, as well as staying at a healthy weight, may help prevent cancer. Where can you learn more? Go to http://jr-coleman.info/. Enter C743 in the search box to learn more about \"Learning About Breast Cancer Screening. \"  Current as of: December 19, 2018  Content Version: 12.2  © 7211-0022 [employer]. Care instructions adapted under license by [employer] (which disclaims liability or warranty for this information). If you have questions about a medical condition or this instruction, always ask your healthcare professional. [place] [address] any warranty or liability for your use of this information.

## 2019-10-14 NOTE — PROGRESS NOTES
Chief Complaint   Patient presents with    Physical     Room 4    Labs     discuss     Subjective:   38 y.o. female for annual medical exam. Agree with nurses note. Her gyn care was done elsewhere. She had a hysterectomy in 2016, done in [place], Dr. Barb Berry. Specific concerns today: Doing well and feeling generally healthy today. Health Maintenance:  Last pap: n/a, hysterectomy in 2016. Last Mammogram: overdue  Bone Density: N/A  Colonoscopy: N/A  Routine Labs reviewed:    Labs:  Urine Test:  Your urine analysis is normal.    Cholesterol Panel: Excellent! Total Cholesterol is 152 (goal <200). Triglycerides are 60.  (goal <150)  Your HDL or \"good\" cholesterol is 34. (goal >40). Your LDL or \"bad\" cholesterol is 92.  (goal <100). CMP:  Fasting blood sugar is normal at 85, your hemoglobin A1C is 5.4.  You do not have diabetes. Kidney function is normal.   Your electrolytes are normal.   Your liver function is normal.     Thyroid:  Your thyroid function is normal.    Vitamin D:  Your vitamin D is low at 19. This is an important nutrient bone health and to fight inflammation and infection. We will start a prescription to increase your Vitamin D level for the next 3 months.  You will take 50,000 international units Vitamin D2 in the form of one tab taken once weekly, I have sent this prescription to your pharmacy on file.  After you complete the prescription, start taking OTC Vitamin D3 2,000 international units daily.  After three months we will recheck your levels.  A normal value is between 40-50 on average.      The symptoms of vitamin D deficiency are sometimes vague and can include tiredness and general aches and pains. Some people may not have any symptoms at all.   Vitamin D also fights infections, including colds and the flu, as it regulates the expression of genes that influence your immune system to attack and destroy bacteria and viruses.      CBC:  Your red blood cell count is slightly low.   Your white blood cell count is normal.   Your platelet count is normal.   You are not anemic and your hemoglobin is 13.3. No visits with results within 3 Month(s) from this visit. Latest known visit with results is:   Orders Only on 05/22/2019   Component Date Value Ref Range Status    Color 06/19/2019 YELLOW  YELLOW Final    Appearance 06/19/2019 CLEAR  CLEAR Final    Specific gravity 06/19/2019 1.018  1.001 - 1.035 Final    pH (UA) 06/19/2019 7.0  5.0 - 8.0 Final    Glucose 06/19/2019 NEGATIVE  NEGATIVE Final    REDUCING SUBSTANCES 06/19/2019 CANCELED   Final    Result canceled by the ancillary.  Bilirubin 06/19/2019 NEGATIVE  NEGATIVE Final    Ketone 06/19/2019 NEGATIVE  NEGATIVE Final    Blood 06/19/2019 NEGATIVE  NEGATIVE Final    Protein 06/19/2019 NEGATIVE  NEGATIVE Final    Nitrites 06/19/2019 NEGATIVE  NEGATIVE Final    Leukocyte Esterase 06/19/2019 NEGATIVE  NEGATIVE Final    WBC 06/19/2019 CANCELED   Final    Result canceled by the ancillary.  RBC 06/19/2019 CANCELED   Final    Result canceled by the ancillary.  SQUAMOUS EPITHELIAL CELLS 06/19/2019 CANCELED   Final    Result canceled by the ancillary.  Transitional epithelial cells 06/19/2019 CANCELED   Final    Result canceled by the ancillary.  Renal epithelial cells 06/19/2019 CANCELED   Final    Result canceled by the ancillary.  Bacteria 06/19/2019 CANCELED   Final    Result canceled by the ancillary.  CA Oxalate crystals 06/19/2019 CANCELED   Final    Result canceled by the ancillary.  Triple Phosphate crystals 06/19/2019 CANCELED   Final    Result canceled by the ancillary.  Uric acid crystals 06/19/2019 CANCELED   Final    Result canceled by the ancillary.  Amorphous sediment 06/19/2019 CANCELED   Final    Result canceled by the ancillary.  Crystals, urine 06/19/2019 CANCELED   Final    Result canceled by the ancillary.     Hyaline cast 06/19/2019 CANCELED   Final    Result canceled by the ancillary.  Granular cast 06/19/2019 CANCELED   Final    Result canceled by the ancillary.  Daniela Ct casts: 06/19/2019 CANCELED   Final    Result canceled by the ancillary.  Yeast, urine 06/19/2019 CANCELED   Final    Result canceled by the ancillary.  COMMENT 06/19/2019 CANCELED   Final    Result canceled by the ancillary.  Cholesterol, total 06/19/2019 152  <200 mg/dL Final    HDL Cholesterol 06/19/2019 60  >50 mg/dL Final    Triglyceride 06/19/2019 34  <150 mg/dL Final    LDL-CHOLESTEROL 06/19/2019 82  mg/dL (calc) Final    Comment: Reference range: <100     Desirable range <100 mg/dL for primary prevention;    <70 mg/dL for patients with CHD or diabetic patients   with > or = 2 CHD risk factors. LDL-C is now calculated using the Skyler-Aguirre   calculation, which is a validated novel method providing   better accuracy than the Friedewald equation in the   estimation of LDL-C. Christine Garber et al. [facility]. 9155;348(47): 2692-9619   (http://education. [employer]/faq/ICE573)      Cholesterol/HDL ratio 06/19/2019 2.5  <5.0 (calc) Final    Non-HDL Cholesterol 06/19/2019 92  <130 mg/dL (calc) Final    Comment: For patients with diabetes plus 1 major ASCVD risk   factor, treating to a non-HDL-C goal of <100 mg/dL   (LDL-C of <70 mg/dL) is considered a therapeutic   option.       Glucose 06/19/2019 85  65 - 99 mg/dL Final    Comment:               Fasting reference interval         BUN 06/19/2019 10  7 - 25 mg/dL Final    Creatinine 06/19/2019 0.74  0.50 - 1.10 mg/dL Final    GFR est non-AA 06/19/2019 99  > OR = 60 mL/min/1.73m2 Final    GFR est AA 06/19/2019 114  > OR = 60 mL/min/1.73m2 Final    BUN/Creatinine ratio 43/30/7977 NOT APPLICABLE  6 - 22 (calc) Final    Sodium 06/19/2019 138  135 - 146 mmol/L Final    Potassium 06/19/2019 4.0  3.5 - 5.3 mmol/L Final    Chloride 06/19/2019 104  98 - 110 mmol/L Final    CO2 06/19/2019 31  20 - 32 mmol/L Final    Calcium 06/19/2019 9.0 8.6 - 10.2 mg/dL Final    Protein, total 06/19/2019 6.5  6.1 - 8.1 g/dL Final    Albumin 06/19/2019 3.9  3.6 - 5.1 g/dL Final    Globulin 06/19/2019 2.6  1.9 - 3.7 g/dL (calc) Final    ALB/GLOBRATIO 06/19/2019 1.5  1.0 - 2.5 (calc) Final    Bilirubin, total 06/19/2019 1.0  0.2 - 1.2 mg/dL Final    Alk. phosphatase 06/19/2019 41  33 - 115 U/L Final    AST (SGOT) 06/19/2019 12  10 - 30 U/L Final    ALT (SGPT) 06/19/2019 7  6 - 29 U/L Final    TSH 06/19/2019 1.12  mIU/L Final    Comment:           Reference Range                         > or = 20 Years  0.40-4.50                              Pregnancy Ranges            First trimester    0.26-2.66            Second trimester   0.55-2.73            Third trimester    0.43-2.91      VITAMIN D, 25-HYDROXY 06/19/2019 19* 30 - 100 ng/mL Final    Comment: Vitamin D Status         25-OH Vitamin D:     Deficiency:                    <20 ng/mL  Insufficiency:             20 - 29 ng/mL  Optimal:                 > or = 30 ng/mL     For 25-OH Vitamin D testing on patients on   D2-supplementation and patients for whom quantitation   of D2 and D3 fractions is required, the QuestAssureD(TM)  25-OH VIT D, (D2,D3), LC/MS/MS is recommended: order   code 33328 (patients >2yrs). For more information on this test, go to:  http://education. [employer]. [employer]/faq/VVQ525  (This link is being provided for   informational/educational purposes only.)      Hemoglobin A1c 06/19/2019 5.4  <5.7 % of total Hgb Final    Comment: For the purpose of screening for the presence of  diabetes:     <5.7%       Consistent with the absence of diabetes  5.7-6.4%    Consistent with increased risk for diabetes              (prediabetes)  > or =6.5%  Consistent with diabetes     This assay result is consistent with a decreased risk  of diabetes. Currently, no consensus exists regarding use of  hemoglobin A1c for diagnosis of diabetes in children.      According to American Diabetes Association (ADA)  guidelines, hemoglobin A1c <7.0% represents optimal  control in non-pregnant diabetic patients. Different  metrics may apply to specific patient populations. Standards of Medical Care in Diabetes(ADA).  eAG (mg/dL) 06/19/2019 108  (calc) Final    eAG (mmol/L) 06/19/2019 6.0  (calc) Final    WBC 06/19/2019 3.1* 3.8 - 10.8 Thousand/uL Final    RBC 06/19/2019 4.09  3.80 - 5.10 Million/uL Final    HGB 06/19/2019 13.3  11.7 - 15.5 g/dL Final    HCT 06/19/2019 38.6  35.0 - 45.0 % Final    MCV 06/19/2019 94.4  80.0 - 100.0 fL Final    MCH 06/19/2019 32.5  27.0 - 33.0 pg Final    MCHC 06/19/2019 34.5  32.0 - 36.0 g/dL Final    RDW 06/19/2019 12.8  11.0 - 15.0 % Final    PLATELET 13/64/2095 761  140 - 400 Thousand/uL Final    MEAN PLATELET VOLUME 93/62/8412 10.6  7.5 - 12.5 fL Final    ABS. NEUTROPHILS 06/19/2019 1,956  1,500 - 7,800 cells/uL Final    ABSOLUTE BANDS 06/19/2019 CANCELED   Final    Result canceled by the ancillary.  ABSOLUTE METAMYELOCYTES 06/19/2019 CANCELED   Final    Result canceled by the ancillary.  ABSOLUTE MYELOCYTES 06/19/2019 CANCELED   Final    Result canceled by the ancillary.  ABSOLUTE PROMYELOCYTES 06/19/2019 CANCELED   Final    Result canceled by the ancillary.  ABS. LYMPHOCYTES 06/19/2019 698* 850 - 3,900 cells/uL Final    ABS. MONOCYTES 06/19/2019 375  200 - 950 cells/uL Final    ABS. EOSINOPHILS 06/19/2019 40  15 - 500 cells/uL Final    ABS. BASOPHILS 06/19/2019 31  0 - 200 cells/uL Final    ABSOLUTE BLASTS 06/19/2019 CANCELED   Final    Result canceled by the ancillary.  ABSOLUTE NRBC 06/19/2019 CANCELED   Final    Result canceled by the ancillary.  Neutrophils 06/19/2019 63.1  % Final    BAND NEUTROPHILS 06/19/2019 CANCELED   Final    Result canceled by the ancillary.  METAMYELOCYTES 06/19/2019 CANCELED   Final    Result canceled by the ancillary.  MYELOCYTES 06/19/2019 CANCELED   Final    Result canceled by the ancillary.     PROMYELOCYTES 06/19/2019 CANCELED   Final    Result canceled by the ancillary.  LYMPHOCYTES 06/19/2019 22.5  % Final    REACTIVE LYMPHS 06/19/2019 CANCELED   Final    Result canceled by the ancillary.  MONOCYTES 06/19/2019 12.1  % Final    EOSINOPHILS 06/19/2019 1.3  % Final    BASOPHILS 06/19/2019 1.0  % Final    BLASTS 06/19/2019 CANCELED   Final    Result canceled by the ancillary.  NRBC 06/19/2019 CANCELED   Final    Result canceled by the ancillary.  COMMENT(S) 06/19/2019 CANCELED   Final    Result canceled by the ancillary. Patient Active Problem List   Diagnosis Code    [facility] G35    Optic neuritis H46.9    History of shingles Z86.19    Vitamin D deficiency E55.9     Patient Active Problem List    Diagnosis Date Noted    Vitamin D deficiency 10/14/2019    History of shingles     [facility] sclerosis ([facility] 03/14/2017    Optic neuritis 05/11/2014     Current Outpatient Medications   Medication Sig Dispense Refill    ibuprofen (MOTRIN) 200 mg tablet 1 tab by mouth daily just prior to injection as needed      ergocalciferol (ERGOCALCIFEROL) 50,000 unit capsule Take 1 Cap by mouth every seven (7) days.  15 Cap 1     Allergies   Allergen Reactions    Toradol [Ketorolac] Other (comments)     hallucinate     Past Medical History:   Diagnosis Date    History of shingles     MS [facility]) ([facility]      Past Surgical History:   Procedure Laterality Date    HX HYSTERECTOMY  02/2016    left ovaries    HX ORTHOPAEDIC  2001, 2003    bilat feet, bunions removed, bones removed from pinky     Family History   Problem Relation Age of Onset    Hypertension Mother     Elevated Lipids Mother     Diabetes Paternal Grandmother     Diabetes Paternal Grandfather      Social History     Tobacco Use    Smoking status: Never Smoker    Smokeless tobacco: Never Used   Substance Use Topics    Alcohol use: No        Review of Systems   Constitutional: Negative for chills, fever, malaise/fatigue and weight loss. HENT: Negative for congestion, ear pain, sinus pain and sore throat. Eyes: Negative for pain and redness. Respiratory: Negative for cough, shortness of breath and wheezing. Cardiovascular: Negative for chest pain, palpitations, orthopnea and leg swelling. Gastrointestinal: Negative for abdominal pain, blood in stool, constipation, diarrhea, heartburn, nausea and vomiting. Genitourinary: Negative for dysuria, frequency and urgency. Musculoskeletal: Negative for joint pain and myalgias. Skin: Negative for rash. Neurological: Negative for dizziness, tingling and headaches. Endo/Heme/Allergies: Negative for environmental allergies. Psychiatric/Behavioral: Negative for depression. The patient is not nervous/anxious and does not have insomnia. Objective: The patient appears well, alert, oriented x 3, in no distress. Visit Vitals  /64 (BP 1 Location: Right arm, BP Patient Position: Sitting)   Pulse 80   Temp 97.8 °F (36.6 °C) (Oral)   Resp 19   Ht 5' 6.5\" (1.689 m)   Wt 173 lb 9.6 oz (78.7 kg)   SpO2 99%   BMI 27.60 kg/m²     Physical Exam   Constitutional: She is oriented to person, place, and time and well-developed, well-nourished, and in no distress. No distress. HENT:   Head: Normocephalic. Right Ear: External ear normal.   Left Ear: External ear normal.   Nose: Nose normal.   Mouth/Throat: Oropharynx is clear and moist. No oropharyngeal exudate. Eyes: Pupils are equal, round, and reactive to light. Conjunctivae and EOM are normal. Right eye exhibits no discharge. Left eye exhibits no discharge. No scleral icterus. Neck: Normal range of motion. Neck supple. No JVD present. No tracheal deviation present. No thyromegaly present. Cardiovascular: Normal rate, regular rhythm, normal heart sounds and intact distal pulses. Exam reveals no gallop and no friction rub. No murmur heard.   Pulmonary/Chest: Effort normal and breath sounds normal. No respiratory distress. She has no wheezes. She has no rales. She exhibits no tenderness. Abdominal: Soft. Bowel sounds are normal. She exhibits no distension and no mass. There is no tenderness. There is no rebound. Musculoskeletal: Normal range of motion. She exhibits no edema, tenderness or deformity. Lymphadenopathy:     She has no cervical adenopathy. Neurological: She is alert and oriented to person, place, and time. She displays normal reflexes. No cranial nerve deficit. She exhibits normal muscle tone. Gait normal. Coordination normal.   Skin: Skin is warm and dry. No rash noted. She is not diaphoretic. No erythema. No pallor. Psychiatric: Mood, memory, affect and judgment normal.     Breast and Pelvic exams are deferred. No results found for this visit on 10/14/19. Assessment/Plan:   lose weight, increase physical activity, call if any problems    ICD-10-CM ICD-9-CM    1. Annual physical exam Z00.00 V70.0    2. Screening for breast cancer Z12.39 V76.10 [facility] MAMMO BI SCREENING INCL CAD   3. Multiple sclerosis ([facility] G35 340    4. History of shingles Z86.19 V12.09    5. Optic neuritis H46.9 377.30    6. Vitamin D deficiency E55.9 268.9 ergocalciferol (ERGOCALCIFEROL) 50,000 unit capsule      VITAMIN D, 25 HYDROXY     Diagnoses and all orders for this visit:    1. Annual physical exam    2. Screening for breast cancer  -     [facility] MAMMO BI SCREENING INCL CAD; Future    3. Multiple sclerosis ([facility]    4. History of shingles    5. Optic neuritis    6. Vitamin D deficiency  -     ergocalciferol (ERGOCALCIFEROL) 50,000 unit capsule; Take 1 Cap by mouth every seven (7) days. -     VITAMIN D, 25 HYDROXY; Future      Follow-up and Dispositions    · Return in about 6 months (around 4/14/2020) for Medication Check, Vitamin D deficiency, labs at 3 months.        lab results and schedule of future lab studies reviewed with patient - recheck vitamin D in 1/2020  reviewed diet, exercise and weight control  reviewed medications and side effects in detail    Health Maintenance: Tdap done in 2015 at [facility], flu vaccine given on 10/8/2019 in outside clinic, MMG ordered, pap not necessary as she had a hysterectomy in 2016, otherwise UTD    Discussed safe sex practices and birth control options  Advised on nutrition, physical activity, weight management, tobacco, alcohol and safety  Reviewed and given written instruction, see below  Follow up in 1 year for annual wellness visit or sooner as needed. Discussed health maintenance screening guidelines. Health Maintenance reviewed - see above.   · Attain and/or maintain a healthy weight (BMI between 18 and 30)  · Attain and/or maintain regular physical activity for 30 minutes 5 days/week   · Eat at least 5 servings of fruits and vegetables daily, preferably fresh  · Limit fast food and prepared foods  · Attain and/or maintain healthy blood pressure   · Attain and/or maintain no nicotine/tobacco use status  · Annual flu shot (or nasal mist as appropriate)  · Stay up-to-date with immunizations including tetanus, pertussis, HPV, & pneumonia  · Annual eye exam   · Biannual dental visits  · Annual skin cancer screening  · Annual gynecologic visit for women over age 24  · Annual prostate exam for black men starting at age 36, and for other races starting at age 48 (unless indicated earlier by history)  · Colonscopy every 10 years after age 48 (unless indicated more frequently by history)  · Consider daily 81mg aspirin for men over age 39 and women over age 54  · Annual screening labs: thyroid tests (TSH and T4), complete blood count, lipid panel (cholesterol), hemoglobin A1c (diabetes screening), comprehensive metabolic panel (includes kidney function, liver function, and electrolytes), vitamin D level, urinalysis (with urine culture and microalbumin as medically indicated)  · Consider annual testing for sexually transmitted infections including HIV  · Screening bone density testing in all women over age 72    Recommended healthy diet low in carbohydrates, fats, sodium and cholesterol. Recommended regular cardiovascular exercise 3-6 times per week for 30-60 minutes daily. Chart is reviewed and updated today in the office. Records requested for other providers patient has seen and is currently seeing. Patient was offered a choice/choices in the treatment plan today. Patient expresses understanding of the plan and agrees with recommendations. Verbal and written instructions (see AVS) provided. See patient instructions for more. Patient expresses understanding of diagnosis and treatment plan.

## 2020-01-01 DIAGNOSIS — E55.9 VITAMIN D DEFICIENCY: ICD-10-CM

## 2020-12-19 ENCOUNTER — HOSPITAL ENCOUNTER (OUTPATIENT)
Dept: INFUSION THERAPY | Age: 45
Discharge: HOME | End: 2020-12-19
Payer: OTHER GOVERNMENT

## 2020-12-19 VITALS
RESPIRATION RATE: 18 BRPM | SYSTOLIC BLOOD PRESSURE: 114 MMHG | HEART RATE: 81 BPM | TEMPERATURE: 97.5 F | DIASTOLIC BLOOD PRESSURE: 75 MMHG

## 2020-12-19 PROCEDURE — 96365 THER/PROPH/DIAG IV INF INIT: CPT

## 2020-12-19 PROCEDURE — 77030012965 HC NDL HUBR BBMI -A

## 2020-12-19 PROCEDURE — 74011250636 HC RX REV CODE- 250/636: Performed by: NURSE PRACTITIONER

## 2020-12-19 PROCEDURE — 74011000258 HC RX REV CODE- 258: Performed by: NURSE PRACTITIONER

## 2020-12-19 RX ORDER — SODIUM CHLORIDE 0.9 % (FLUSH) 0.9 %
5-10 SYRINGE (ML) INJECTION AS NEEDED
Status: DISCONTINUED | OUTPATIENT
Start: 2020-12-19 | End: 2020-12-20 | Stop reason: HOSPADM

## 2020-12-19 RX ADMIN — Medication 10 ML: at 11:34

## 2020-12-19 RX ADMIN — SODIUM CHLORIDE 1000 MG: 900 INJECTION INTRAVENOUS at 07:50

## 2020-12-19 NOTE — PROGRESS NOTES
[facility] Note:  [id number] arrived at [address] [place] [address] ambulatory and in no distress for solumedrol    Assessment stable, no new complaints voiced. Pt had relapse of MS and is here for first time solumedrol. She has had tysabri at [facility] in past.  Patient danies SOB, fever, cough, general not feeling well. Patient denies recent exposure to someone who has tested positive for COVID. Patient denies having contact with anyone who has a pending COVID test.      Medications received: Solumedrol over 1 hour     Tolerated treatment well, no adverse reaction noted. D/Cd from [address] [place] [address] and in no distress accompanied by self. Next appt 12/20  0830  Visit Vitals  /63   Pulse 91   Temp 97.5 °F (36.4 °C)   Resp 18     No results found for this or any previous visit (from the past 12 hour(s)).

## 2020-12-20 ENCOUNTER — HOSPITAL ENCOUNTER (OUTPATIENT)
Dept: INFUSION THERAPY | Age: 45
Discharge: HOME | End: 2020-12-20
Payer: OTHER GOVERNMENT

## 2020-12-20 VITALS
RESPIRATION RATE: 18 BRPM | SYSTOLIC BLOOD PRESSURE: 121 MMHG | TEMPERATURE: 97.3 F | DIASTOLIC BLOOD PRESSURE: 75 MMHG | HEART RATE: 101 BPM

## 2020-12-20 PROCEDURE — 74011250636 HC RX REV CODE- 250/636: Performed by: NURSE PRACTITIONER

## 2020-12-20 PROCEDURE — 74011000258 HC RX REV CODE- 258: Performed by: NURSE PRACTITIONER

## 2020-12-20 PROCEDURE — 96365 THER/PROPH/DIAG IV INF INIT: CPT

## 2020-12-20 RX ADMIN — SODIUM CHLORIDE 1000 MG: 900 INJECTION INTRAVENOUS at 08:24

## 2020-12-20 NOTE — PROGRESS NOTES
[facility] Progress Note    Date: 2020    Name: Iván Hinkle    MRN: [id number]         : 1975        0810: Pt arrived ambulatory to Health system for Solumedrol in stable condition. Assessment completed. No other complaints voiced at this time. Peripheral  IV established left forearm with positive blood return. Patient denies SOB, fever, cough, general not feeling well. Patient denies recent exposure to someone who has tested positive for COVID-19. Patient denies having contact with anyone who has a pending COVID test.      Patient Vitals for the past 12 hrs:   Temp Pulse Resp BP   20 0817 97.3 °F (36.3 °C) (!) 101 18 121/75         Medications Administered     methylprednisoLONE (Solu-MEDROL) 1 gm in 100 ml NS MBP     Admin Date  2020 Action  New Bag Dose  1,000 mg Rate  100 mL/hr Route  IntraVENous Administered By  Santos Zamorano, RN                  3035: Tolerated treatment well, no adverse reactions noted. Peripheral IV flushed and curos cap placed on end clave. D/Cd from Health system ambulatory and in no distress.       Future Appointments   Date Time Provider Jenna Massey   2020  5:30 PM G2 JIGNESH FASTRACK JIMBOB [facility]    2020  5:00 PM G2 JIGNESH FASTRACK RCKATEYB [facility]    2020  6:00 PM G2 JIGNESH CHOIB [address], RN  2020

## 2020-12-21 ENCOUNTER — HOSPITAL ENCOUNTER (OUTPATIENT)
Dept: INFUSION THERAPY | Age: 45
Discharge: HOME | End: 2020-12-21
Payer: OTHER GOVERNMENT

## 2020-12-21 VITALS
DIASTOLIC BLOOD PRESSURE: 65 MMHG | RESPIRATION RATE: 16 BRPM | TEMPERATURE: 97.7 F | SYSTOLIC BLOOD PRESSURE: 114 MMHG | HEART RATE: 85 BPM

## 2020-12-21 PROCEDURE — 74011250636 HC RX REV CODE- 250/636: Performed by: NURSE PRACTITIONER

## 2020-12-21 PROCEDURE — 96365 THER/PROPH/DIAG IV INF INIT: CPT

## 2020-12-21 PROCEDURE — 74011000258 HC RX REV CODE- 258: Performed by: NURSE PRACTITIONER

## 2020-12-21 RX ADMIN — SODIUM CHLORIDE 1000 MG: 900 INJECTION INTRAVENOUS at 17:44

## 2020-12-21 NOTE — PROGRESS NOTES
[facility] Note:  [id number] arrived at [facility] ambulatory and in no distress for D3 solumedrol. Assessment stable, no new complaints voiced. She had headache for a while after infusion. She reports she still has taste change, metallic. IV intact from Sunday. Medications received: Solumedrol over one hour. IV flushed and secured. [facility] well, no adverse reaction noted. D/Cd from [facility] ambulatory and in no distress accompanied by self. Next appt 12/22  Visit Vitals  /65   Pulse 85   Temp 97.7 °F (36.5 °C)   Resp 16     No results found for this or any previous visit (from the past 12 hour(s)).

## 2020-12-22 ENCOUNTER — HOSPITAL ENCOUNTER (OUTPATIENT)
Dept: INFUSION THERAPY | Age: 45
Discharge: HOME | End: 2020-12-22
Payer: OTHER GOVERNMENT

## 2020-12-22 PROCEDURE — 96365 THER/PROPH/DIAG IV INF INIT: CPT

## 2020-12-22 PROCEDURE — 74011250636 HC RX REV CODE- 250/636: Performed by: NURSE PRACTITIONER

## 2020-12-22 PROCEDURE — 74011000258 HC RX REV CODE- 258: Performed by: NURSE PRACTITIONER

## 2020-12-22 RX ADMIN — SODIUM CHLORIDE 1000 MG: 900 INJECTION INTRAVENOUS at 16:20

## 2020-12-22 NOTE — PROGRESS NOTES
[facility] Note:  1600Pt arrived at [facility] ambulatory and in no distress for D4/5. Assessment stable, no new complaints voiced. Pt declined contact with anyone who is showing symptoms of covid nor does she have any s/s of covid. Medications received:  Medications Administered     methylprednisoLONE (Solu-MEDROL) 1 gm in 100 ml NS MBP     Admin Date  12/22/2020 Action  New Bag Dose  1,000 mg Rate  100 mL/hr Route  IntraVENous Administered By  Isaura Chacon RN            Secured peripheral iv.    1720 Tolerated treatment well, no adverse reaction noted. D/Cd from [facility] ambulatory and in no distress accompanied by self. Next appt 12/23  Visit Vitals  BP (P) 122/69   Pulse (P) 96   Resp (P) 16     No results found for this or any previous visit (from the past 12 hour(s)).

## 2020-12-23 ENCOUNTER — HOSPITAL ENCOUNTER (OUTPATIENT)
Dept: INFUSION THERAPY | Age: 45
Discharge: HOME | End: 2020-12-23
Payer: OTHER GOVERNMENT

## 2020-12-23 VITALS
SYSTOLIC BLOOD PRESSURE: 116 MMHG | TEMPERATURE: 96.8 F | RESPIRATION RATE: 16 BRPM | DIASTOLIC BLOOD PRESSURE: 75 MMHG | HEART RATE: 91 BPM

## 2020-12-23 PROCEDURE — 74011000258 HC RX REV CODE- 258: Performed by: NURSE PRACTITIONER

## 2020-12-23 PROCEDURE — 96365 THER/PROPH/DIAG IV INF INIT: CPT

## 2020-12-23 PROCEDURE — 74011250636 HC RX REV CODE- 250/636: Performed by: NURSE PRACTITIONER

## 2020-12-23 RX ADMIN — SODIUM CHLORIDE 1000 MG: 900 INJECTION INTRAVENOUS at 17:53

## 2020-12-23 NOTE — PROGRESS NOTES
[facility] Progress Note      1750 Pt arrived ambulatory to [facility] for Solumedrol in stable condition. Assessment completed. No other complaints voiced at this time. Peripheral IV flushed with good blood return and connected to infusion. Patient denies SOB, fever, cough, general not feeling well. Patient denies recent exposure to someone who has tested positive for COVID-19. Patient denies having contact with anyone who has a pending COVID test.      Visit Vitals  /75 (BP 1 Location: Right arm, BP Patient Position: Sitting)   Pulse 91   Temp 96.8 °F (36 °C)   Resp 16       Medications Administered     methylprednisoLONE (Solu-MEDROL) 1 gm in 100 ml NS MBP     Admin Date  12/23/2020 Action  New Bag Dose  1,000 mg Rate  100 mL/hr Route  IntraVENous Administered By  Maren Zazueta RN                4650  Tolerated treatment well, no adverse reactions noted. Peripheral IV removed without difficulty D/Cd from [facility] ambulatory and in no distress. No future appointments.         Sanchez Lyn RN  December 23, 2020

## 2021-04-27 ENCOUNTER — OFFICE VISIT (OUTPATIENT)
Dept: INTERNAL MEDICINE CLINIC | Age: 46
End: 2021-04-27
Payer: OTHER GOVERNMENT

## 2021-04-27 VITALS
RESPIRATION RATE: 17 BRPM | SYSTOLIC BLOOD PRESSURE: 100 MMHG | DIASTOLIC BLOOD PRESSURE: 70 MMHG | WEIGHT: 189 LBS | HEART RATE: 75 BPM | OXYGEN SATURATION: 98 % | HEIGHT: 66 IN | TEMPERATURE: 98 F | BODY MASS INDEX: 30.37 KG/M2

## 2021-04-27 DIAGNOSIS — Z76.89 ESTABLISHING CARE WITH NEW DOCTOR, ENCOUNTER FOR: Primary | ICD-10-CM

## 2021-04-27 DIAGNOSIS — G35 MULTIPLE SCLEROSIS (HCC): ICD-10-CM

## 2021-04-27 DIAGNOSIS — Z11.59 ENCOUNTER FOR HEPATITIS C SCREENING TEST FOR LOW RISK PATIENT: ICD-10-CM

## 2021-04-27 DIAGNOSIS — J30.89 ENVIRONMENTAL AND SEASONAL ALLERGIES: ICD-10-CM

## 2021-04-27 DIAGNOSIS — E55.9 VITAMIN D DEFICIENCY: ICD-10-CM

## 2021-04-27 LAB
25(OH)D3 SERPL-MCNC: 38.8 NG/ML (ref 30–100)
ALBUMIN SERPL-MCNC: 3.8 G/DL (ref 3.5–5)
ALBUMIN/GLOB SERPL: 1.1 {RATIO} (ref 1.1–2.2)
ALP SERPL-CCNC: 53 U/L (ref 45–117)
ALT SERPL-CCNC: 13 U/L (ref 12–78)
ANION GAP SERPL CALC-SCNC: 5 MMOL/L (ref 5–15)
AST SERPL-CCNC: 11 U/L (ref 15–37)
BILIRUB SERPL-MCNC: 1.1 MG/DL (ref 0.2–1)
BUN SERPL-MCNC: 14 MG/DL (ref 6–20)
BUN/CREAT SERPL: 20 (ref 12–20)
CALCIUM SERPL-MCNC: 9.3 MG/DL (ref 8.5–10.1)
CHLORIDE SERPL-SCNC: 105 MMOL/L (ref 97–108)
CHOLEST SERPL-MCNC: 173 MG/DL
CO2 SERPL-SCNC: 27 MMOL/L (ref 21–32)
CREAT SERPL-MCNC: 0.69 MG/DL (ref 0.55–1.02)
ERYTHROCYTE [DISTWIDTH] IN BLOOD BY AUTOMATED COUNT: 12.7 % (ref 11.5–14.5)
GLOBULIN SER CALC-MCNC: 3.5 G/DL (ref 2–4)
GLUCOSE POC: 88 MG/DL
GLUCOSE SERPL-MCNC: 82 MG/DL (ref 65–100)
HBA1C MFR BLD HPLC: 5.2 %
HCT VFR BLD AUTO: 42.1 % (ref 35–47)
HCV AB SERPL QL IA: NONREACTIVE
HCV COMMENT,HCGAC: NORMAL
HDLC SERPL-MCNC: 63 MG/DL
HGB BLD-MCNC: 13.8 G/DL (ref 11.5–16)
LDL CHOLESTEROL POC: 95 MG/DL
MCH RBC QN AUTO: 31.4 PG (ref 26–34)
MCHC RBC AUTO-ENTMCNC: 32.8 G/DL (ref 30–36.5)
MCV RBC AUTO: 95.7 FL (ref 80–99)
NON-HDL GOAL (POC): 110
NRBC # BLD: 0 K/UL (ref 0–0.01)
NRBC BLD-RTO: 0 PER 100 WBC
PLATELET # BLD AUTO: 256 K/UL (ref 150–400)
PMV BLD AUTO: 10.4 FL (ref 8.9–12.9)
POTASSIUM SERPL-SCNC: 4.7 MMOL/L (ref 3.5–5.1)
PROT SERPL-MCNC: 7.3 G/DL (ref 6.4–8.2)
RBC # BLD AUTO: 4.4 M/UL (ref 3.8–5.2)
SODIUM SERPL-SCNC: 137 MMOL/L (ref 136–145)
TCHOL/HDL RATIO (POC): 2.7
TRIGL SERPL-MCNC: 72 MG/DL
WBC # BLD AUTO: 3.1 K/UL (ref 3.6–11)

## 2021-04-27 PROCEDURE — 99204 OFFICE O/P NEW MOD 45 MIN: CPT | Performed by: NURSE PRACTITIONER

## 2021-04-27 PROCEDURE — 80061 LIPID PANEL: CPT | Performed by: NURSE PRACTITIONER

## 2021-04-27 PROCEDURE — 81003 URINALYSIS AUTO W/O SCOPE: CPT | Performed by: NURSE PRACTITIONER

## 2021-04-27 PROCEDURE — 83036 HEMOGLOBIN GLYCOSYLATED A1C: CPT | Performed by: NURSE PRACTITIONER

## 2021-04-27 PROCEDURE — 82962 GLUCOSE BLOOD TEST: CPT | Performed by: NURSE PRACTITIONER

## 2021-04-27 RX ORDER — CETIRIZINE HCL 10 MG
10 TABLET ORAL
Qty: 30 TAB | Refills: 0 | Status: SHIPPED | OUTPATIENT
Start: 2021-04-27 | End: 2021-10-20 | Stop reason: SDUPTHER

## 2021-04-27 NOTE — PROGRESS NOTES
Chief Complaint   Patient presents with   [address] [place] [address]     1. Have you been to the ER, urgent care clinic since your last visit? Hospitalized since your last visit? No    2. Have you seen or consulted any other health care providers outside of the [address] [place] [address] since your last visit? Include any pap smears or colon screening.  No

## 2021-04-27 NOTE — PATIENT INSTRUCTIONS
Multiple Sclerosis (MS): Care Instructions Your Care Instructions Multiple sclerosis, also called MS, is a disease that can affect the brain, spinal cord, and nerves to the eyes. MS can cause problems with muscle control and strength, vision, balance, feeling, and thinking. Whatever your symptoms are, taking medicine correctly and following your doctor's advice for home care can help you maintain your quality of life. Follow-up care is a key part of your treatment and safety. Be sure to make and go to all appointments, and call your doctor if you are having problems. It's also a good idea to know your test results and keep a list of the medicines you take. How can you care for yourself at home? General care · Take your medicines exactly as prescribed. Call your doctor if you think you are having a problem with your medicine. · Use a cane, walker, or scooter if your doctor suggests it. · Keep doing your normal activities as much as you can. · If you have problems urinating, press or tap your bladder area to help start urine flow. If you have trouble controlling your urine, plan your fluid intake and activities so that a toilet will be available when you need it. · Spend time with family and friends. Join a support group for people with MS if you want extra help. · Depression is common with this condition. Tell your doctor if you have trouble sleeping, are eating too much or are not hungry, or feel sad or tearful all the time. Depression can be treated with medicine and counseling. Diet and exercise · Eat a balanced diet. · If you have problems swallowing, change how and what you eat: ? Try thick drinks, such as milk shakes. They are easier to swallow than other fluids. ? Do not eat foods that crumble easily. These can cause choking. ? Use a  to prepare food. Soft foods need less chewing. ? Eat small meals often so that you do not get tired from eating larger meals.  
· Get exercise on most days. Work with your doctor to set up a program of walking, swimming, or other exercise that you are able to do. A physical therapist can teach you exercises if you cannot walk but can move your limbs and trunk. Or you can do exercises to help with coordination and balance. You can help improve muscle stiffness by doing exercises while lying in certain positions. When should you call for help? Call your doctor now or seek immediate medical care if: 
  · You have a change in symptoms.  
  · You fall or have another injury.  
  · You have symptoms of a urinary infection. For example: ? You have blood or pus in your urine. ? You have pain in your back just below your rib cage. This is called flank pain. ? You have a fever, chills, or body aches. ? It hurts to urinate. ? You have groin or belly pain. Watch closely for changes in your health, and be sure to contact your doctor if: 
  · You want more information about MS or medicines.  
  · You have questions about alternative treatments. Do not use any other treatments without talking to your doctor first.  
Where can you learn more? Go to http://www.gray.com/ Enter L044 in the search box to learn more about \"Multiple Sclerosis (MS): Care Instructions. \" Current as of: August 4, 2020               Content Version: 12.8 © 2006-2021 [employer]. Care instructions adapted under license by [employer] (which disclaims liability or warranty for this information). If you have questions about a medical condition or this instruction, always ask your healthcare professional. [place] [address] any warranty or liability for your use of this information. Body Mass Index: Care Instructions Your Care Instructions Body mass index (BMI) can help you see if your weight is raising your risk for health problems. It uses a formula to compare how much you weigh with how tall you are.  
· A BMI lower than 18.5 is considered underweight. · A BMI between 18.5 and 24.9 is considered healthy. · A BMI between 25 and 29.9 is considered overweight. A BMI of 30 or higher is considered obese. If your BMI is in the normal range, it means that you have a lower risk for weight-related health problems. If your BMI is in the overweight or obese range, you may be at increased risk for weight-related health problems, such as high blood pressure, heart disease, stroke, arthritis or joint pain, and diabetes. If your BMI is in the underweight range, you may be at increased risk for health problems such as fatigue, lower protection (immunity) against illness, muscle loss, bone loss, hair loss, and hormone problems. BMI is just one measure of your risk for weight-related health problems. You may be at higher risk for health problems if you are not active, you eat an unhealthy diet, or you drink too much alcohol or use tobacco products. Follow-up care is a key part of your treatment and safety. Be sure to make and go to all appointments, and call your doctor if you are having problems. It's also a good idea to know your test results and keep a list of the medicines you take. How can you care for yourself at home? · Practice healthy eating habits. This includes eating plenty of fruits, vegetables, whole grains, lean protein, and low-fat dairy. · If your doctor recommends it, get more exercise. Walking is a good choice. Bit by bit, increase the amount you walk every day. Try for at least 30 minutes on most days of the week. · Do not smoke. Smoking can increase your risk for health problems. If you need help quitting, talk to your doctor about stop-smoking programs and medicines. These can increase your chances of quitting for good. · Limit alcohol to 2 drinks a day for men and 1 drink a day for women. Too much alcohol can cause health problems. If you have a BMI higher than 25 · Your doctor may do other tests to check your risk for weight-related health problems. This may include measuring the distance around your waist. A waist measurement of more than 40 inches in men or 35 inches in women can increase the risk of weight-related health problems. · Talk with your doctor about steps you can take to stay healthy or improve your health. You may need to make lifestyle changes to lose weight and stay healthy, such as changing your diet and getting regular exercise. If you have a BMI lower than 18.5 · Your doctor may do other tests to check your risk for health problems. · Talk with your doctor about steps you can take to stay healthy or improve your health. You may need to make lifestyle changes to gain or maintain weight and stay healthy, such as getting more healthy foods in your diet and doing exercises to build muscle. Where can you learn more? Go to http://www.fontanez.com/ Enter S176 in the search box to learn more about \"Body Mass Index: Care Instructions. \" Current as of: September 23, 2020               Content Version: 12.8 © 2006-2021 [employer]. Care instructions adapted under license by [employer] (which disclaims liability or warranty for this information). If you have questions about a medical condition or this instruction, always ask your healthcare professional. [address] any warranty or liability for your use of this information. Learning About Vitamin D Why is it important to get enough vitamin D? Your body needs vitamin D to absorb calcium. Calcium keeps your bones and muscles, including your heart, healthy and strong. If your muscles don't get enough calcium, they can cramp, hurt, or feel weak. You may have long-term (chronic) muscle aches and pains. If you don't get enough vitamin D throughout life, you have an increased chance of having thin and brittle bones (osteoporosis) in your later years.  Children who don't get enough vitamin D may not grow as much as others their age. They also have a chance of getting a rare disease called rickets. It causes weak bones. Vitamin D and calcium are added to many foods. And your body uses sunshine to make its own vitamin D. How much vitamin D do you need? The recommended daily allowance (RDA) for vitamin D is 600 IU (international units) every day for people ages 3 through 79. Adults 71 and older need 800 IU every day. Blood tests for vitamin D can check your vitamin D level. But there is no standard normal range used by all laboratories. You're likely getting enough vitamin D if your levels are in the range of 20 to 50 ng/mL. How can you get more vitamin D? Foods that contain vitamin D include: 
· [place], tuna, and mackerel. These are some of the best foods to eat when you need to get more vitamin D. 
· Cheese, egg yolks, and beef liver. These foods have vitamin D in small amounts. · Milk, soy drinks, orange juice, yogurt, margarine, and some kinds of cereal have vitamin D added to them. Some people don't make vitamin D as well as others. They may have to take extra care in getting enough vitamin D. Things that reduce how much vitamin D your body makes include: · Dark skin, such as many  Americans have. · Age, especially if you are older than 72. · Digestive problems, such as Crohn's or celiac disease. · Liver and kidney disease. Some people who do not get enough vitamin D may need supplements. Are there any risks from taking vitamin D? 
· Too much vitamin D: 
? Can damage your kidneys. ? Can cause nausea and vomiting, constipation, and weakness. ? Raises the amount of calcium in your blood. If this happens, you can get confused or have an irregular heart rhythm. · Vitamin D may interact with other medicines. Tell your doctor about all of the medicines you take, including over-the-counter drugs, herbs, and pills.  Tell your doctor about all of your current medical problems. Where can you learn more? Go to http://www.gray.com/ Enter [id number] in the search box to learn more about \"Learning About Vitamin D.\" 
Current as of: December 17, 2020               Content Version: 12.8 © 3964-3273 [employer], [facility]. Care instructions adapted under license by [employer] (which disclaims liability or warranty for this information). If you have questions about a medical condition or this instruction, always ask your healthcare professional. [address] any warranty or liability for your use of this information.

## 2021-04-27 NOTE — PROGRESS NOTES
Chief Complaint   Patient presents with   [address]     3 most recent PHQ Screens 4/27/2021   Little interest or pleasure in doing things Not at all   Feeling down, depressed, irritable, or hopeless Not at all   Total Score PHQ 2 0   Trouble falling or staying asleep, or sleeping too much -   Feeling tired or having little energy -   Poor appetite, weight loss, or overeating -   Feeling bad about yourself - or that you are a failure or have let yourself or your family down -   Trouble concentrating on things such as school, work, reading, or watching TV -   Moving or speaking so slowly that other people could have noticed; or the opposite being so fidgety that others notice -   Thoughts of being better off dead, or hurting yourself in some way -   PHQ 9 Score -   How difficult have these problems made it for you to do your work, take care of your home and get along with others -     1. Have you been to the ER, urgent care clinic since your last visit? Hospitalized since your last visit?no    2. Have you seen or consulted any other health care providers outside of the [address] [place] [address] since your last visit? Include any pap smears or colon screening.  no

## 2021-04-28 NOTE — PROGRESS NOTES
Subjective:  Esdras Escobar is a 54 y.o. female and presents with Multiple Sclerosis, Vit D Deficiency and Environmental/Seasonal Allergies to Establish Care  . Multiple Sclerosis Review:  Patient is presenting today for evaluation and treatment of multiple sclerosis, not currently in any distress. Patient denies visual symptoms including persistent visual blurring , subdued color perception and diplopia, sensory symptoms, motor symptoms, cerebellar symptoms, cranial nerve symptoms, urinary tract symptoms, cognitive symptoms, sexual symptoms, affective symptoms and constitutional symptoms. VitaminD Deficiency Review  Patient presents with Vitamin D deficiency. Patient denies symptoms of bone pain, muscle weakness, Fatigue or other neurological symptoms. Labs were ordered at patient's last office visit to assess the need for continued  Vitamin D replacement. Review of Systems  Constitutional: negative for fevers, chills, anorexia and weight loss  Eyes:   negative for visual disturbance and irritation  ENT:   negative for tinnitus,sore throat,nasal congestion,ear pains. hoarseness  Respiratory:  negative for cough, hemoptysis, dyspnea,wheezing  CV:   negative for chest pain, palpitations, lower extremity edema  GI:   negative for nausea, vomiting, diarrhea, abdominal pain,melena  Endo:               negative for polyuria,polydipsia,polyphagia,heat intolerance  Genitourinary: negative for frequency, dysuria and hematuria  Integument:  negative for rash and pruritus  Hematologic:  negative for easy bruising and gum/nose bleeding  Musculoskel: negative for myalgias, arthralgias, back pain, muscle weakness, joint pain  Neurological:  negative for headaches, dizziness, vertigo, memory problems and gait   Behavl/Psych: negative for feelings of anxiety, depression, mood changes    Past Medical History:   Diagnosis Date    History of shingles     MS (multiple sclerosis) ([facility] [address]      Past Surgical History: Procedure Laterality Date    HX HYSTERECTOMY  02/2016    left ovaries    HX ORTHOPAEDIC  2001, 2003    bilat feet, bunions removed, bones removed from pinky     Social History     Socioeconomic History    Marital status:      Spouse name: Not on file    Number of children: Not on file    Years of education: Not on file    Highest education level: Not on file   Tobacco Use    Smoking status: Never Smoker    Smokeless tobacco: Never Used   Substance and Sexual Activity    Alcohol use: No    Drug use: No    Sexual activity: Not Currently     Family History   Problem Relation Age of Onset    Hypertension Mother     Elevated Lipids Mother     Diabetes Paternal Grandmother     Diabetes Paternal Grandfather      Current Outpatient Medications   Medication Sig Dispense Refill    ocrelizumab (OCREVUS IV) by IntraVENous route.  cetirizine (ZYRTEC) 10 mg tablet Take 1 Tab by mouth nightly. 30 Tab 0    ergocalciferol (ERGOCALCIFEROL) 50,000 unit capsule Take 1 Cap by mouth every seven (7) days.  15 Cap 1    ibuprofen (MOTRIN) 200 mg tablet 1 tab by mouth daily just prior to injection as needed       Allergies   Allergen Reactions    Toradol [Ketorolac] Other (comments)     hallucinate       Objective:  Visit Vitals  /70 (BP 1 Location: Right arm, BP Patient Position: Sitting, BP Cuff Size: Adult)   Pulse 75   Temp 98 °F (36.7 °C) (Oral)   Resp 17   Ht 5' 6\" (1.676 m)   Wt 189 lb (85.7 kg)   SpO2 98%   BMI 30.51 kg/m²     Physical Exam:   General appearance - alert, well appearing, and in no distress  Mental status - alert, oriented to person, place, and time  EYE-ROSARIO, EOMI, corneas normal, no foreign bodies  ENT-ENT exam normal, no neck nodes or sinus tenderness  Nose - normal and patent, no erythema, discharge or polyps  Mouth - mucous membranes moist, pharynx normal without lesions  Neck - supple, no significant adenopathy   Chest - clear to auscultation, no wheezes, rales or rhonchi, symmetric air entry   Heart - normal rate, regular rhythm, normal S1, S2, no murmurs, rubs, clicks or gallops   Abdomen - soft, nontender, nondistended, no masses or organomegaly  Lymph- no adenopathy palpable  Ext-peripheral pulses normal, no pedal edema, no clubbing or cyanosis  Skin-Warm and dry. no hyperpigmentation, vitiligo, or suspicious lesions  Neuro -alert, oriented, normal speech, no focal findings or movement disorder noted  Musculoskeletal-normal C-spine, no tenderness, full ROM without pain  Feet-no nail deformities or callus formation with good pulses noted      Results for orders placed or performed in visit on 04/27/21   HEPATITIS C AB   Result Value Ref Range    Hep C virus Ab Interp. NONREACTIVE NONREACTIVE      Hep C  virus Ab comment Method used is Siemens Advia Centaur     VITAMIN D, 25 HYDROXY   Result Value Ref Range    Vitamin D 25-Hydroxy 38.8 30 - 021 ng/mL   METABOLIC PANEL, COMPREHENSIVE   Result Value Ref Range    Sodium 137 136 - 145 mmol/L    Potassium 4.7 3.5 - 5.1 mmol/L    Chloride 105 97 - 108 mmol/L    CO2 27 21 - 32 mmol/L    Anion gap 5 5 - 15 mmol/L    Glucose 82 65 - 100 mg/dL    BUN 14 6 - 20 MG/DL    Creatinine 0.69 0.55 - 1.02 MG/DL    BUN/Creatinine ratio 20 12 - 20      GFR est AA >60 >60 ml/min/1.73m2    GFR est non-AA >60 >60 ml/min/1.73m2    Calcium 9.3 8.5 - 10.1 MG/DL    Bilirubin, total 1.1 (H) 0.2 - 1.0 MG/DL    ALT (SGPT) 13 12 - 78 U/L    AST (SGOT) 11 (L) 15 - 37 U/L    Alk.  phosphatase 53 45 - 117 U/L    Protein, total 7.3 6.4 - 8.2 g/dL    Albumin 3.8 3.5 - 5.0 g/dL    Globulin 3.5 2.0 - 4.0 g/dL    A-G Ratio 1.1 1.1 - 2.2     CBC W/O DIFF   Result Value Ref Range    WBC 3.1 (L) 3.6 - 11.0 K/uL    RBC 4.40 3.80 - 5.20 M/uL    HGB 13.8 11.5 - 16.0 g/dL    HCT 42.1 35.0 - 47.0 %    MCV 95.7 80.0 - 99.0 FL    MCH 31.4 26.0 - 34.0 PG    MCHC 32.8 30.0 - 36.5 g/dL    RDW 12.7 11.5 - 14.5 %    PLATELET 603 546 - 960 K/uL    MPV 10.4 8.9 - 12.9 FL    NRBC 0.0 0 PER 100 WBC    ABSOLUTE NRBC 0.00 0.00 - 0.01 K/uL   AMB POC GLUCOSE BLOOD, BY GLUCOSE MONITORING DEVICE   Result Value Ref Range    Glucose POC 88 MG/DL   AMB POC HEMOGLOBIN A1C   Result Value Ref Range    Hemoglobin A1c (POC) 5.2 %   AMB POC LIPID PROFILE   Result Value Ref Range    Cholesterol (POC) 173     Triglycerides (POC) 72     HDL Cholesterol (POC) 63     LDL Cholesterol (POC) 95 MG/DL    Non-HDL Goal (POC) 110     TChol/HDL Ratio (POC) 2.7        Assessment/Plan:    ICD-10-CM ICD-9-CM    1. Establishing care with new doctor, encounter for  Z76.89 V65.8 CBC W/O DIFF      METABOLIC PANEL, COMPREHENSIVE      AMB POC GLUCOSE BLOOD, BY GLUCOSE MONITORING DEVICE      AMB POC HEMOGLOBIN A1C      AMB POC LIPID PROFILE      AMB POC URINALYSIS DIP STICK AUTO W/O MICRO      AMB POC URINALYSIS DIP STICK AUTO W/ MICRO       METABOLIC PANEL, COMPREHENSIVE      CBC W/O DIFF   2. Multiple sclerosis ([facility] [address]  G35 340    3. Vitamin D deficiency  E55.9 268.9 VITAMIN D, 25 HYDROXY      VITAMIN D, 25 HYDROXY   4. Environmental and seasonal allergies  J30.89 477.8 cetirizine (ZYRTEC) 10 mg tablet   5. Encounter for hepatitis C screening test for low risk patient  Z11.59 V73.89 HEPATITIS C AB      HEPATITIS C AB   6.  Body mass index (BMI) of 30.0 to 30.9 in adult  Z68.30 V85.30      Orders Placed This Encounter    CBC W/O DIFF     Standing Status:   Future     Number of Occurrences:   1     Standing Expiration Date:   9/27/6477    METABOLIC PANEL, COMPREHENSIVE     Standing Status:   Future     Number of Occurrences:   1     Standing Expiration Date:   4/27/2022    VITAMIN D, 25 HYDROXY     Standing Status:   Future     Number of Occurrences:   1     Standing Expiration Date:   4/27/2022    HEPATITIS C AB     Standing Status:   Future     Number of Occurrences:   1     Standing Expiration Date:   4/27/2022    AMB POC GLUCOSE BLOOD, BY GLUCOSE MONITORING DEVICE    AMB POC HEMOGLOBIN A1C    AMB POC LIPID PROFILE    AMB POC URINALYSIS DIP STICK AUTO W/O MICRO    AMB POC URINALYSIS DIP STICK AUTO W/ MICRO     DISCONTD: methylPREDNISolone, PF, (SOLU-MEDROL) 1,000 mg/8 mL solr injection     Sig: Infuse 1000mg daily for 5 days for MS relapse.  ocrelizumab (OCREVUS IV)     Sig: by IntraVENous route.  cetirizine (ZYRTEC) 10 mg tablet     Sig: Take 1 Tab by mouth nightly. Dispense:  30 Tab     Refill:  0     Patient Instructions          Multiple Sclerosis (MS): Care Instructions  Your Care Instructions  Multiple sclerosis, also called MS, is a disease that can affect the brain, spinal cord, and nerves to the eyes. MS can cause problems with muscle control and strength, vision, balance, feeling, and thinking. Whatever your symptoms are, taking medicine correctly and following your doctor's advice for home care can help you maintain your quality of life. Follow-up care is a key part of your treatment and safety. Be sure to make and go to all appointments, and call your doctor if you are having problems. It's also a good idea to know your test results and keep a list of the medicines you take. How can you care for yourself at home? General care  · Take your medicines exactly as prescribed. Call your doctor if you think you are having a problem with your medicine. · Use a cane, walker, or scooter if your doctor suggests it. · Keep doing your normal activities as much as you can. · If you have problems urinating, press or tap your bladder area to help start urine flow. If you have trouble controlling your urine, plan your fluid intake and activities so that a toilet will be available when you need it. · Spend time with family and friends. Join a support group for people with MS if you want extra help. · Depression is common with this condition. Tell your doctor if you have trouble sleeping, are eating too much or are not hungry, or feel sad or tearful all the time. Depression can be treated with medicine and counseling.   Diet and exercise  · Eat a balanced diet. · If you have problems swallowing, change how and what you eat:  ? Try thick drinks, such as milk shakes. They are easier to swallow than other fluids. ? Do not eat foods that crumble easily. These can cause choking. ? Use a  to prepare food. Soft foods need less chewing. ? Eat small meals often so that you do not get tired from eating larger meals. · Get exercise on most days. Work with your doctor to set up a program of walking, swimming, or other exercise that you are able to do. A physical therapist can teach you exercises if you cannot walk but can move your limbs and trunk. Or you can do exercises to help with coordination and balance. You can help improve muscle stiffness by doing exercises while lying in certain positions. When should you call for help? Call your doctor now or seek immediate medical care if:    · You have a change in symptoms.     · You fall or have another injury.     · You have symptoms of a urinary infection. For example:  ? You have blood or pus in your urine. ? You have pain in your back just below your rib cage. This is called flank pain. ? You have a fever, chills, or body aches. ? It hurts to urinate. ? You have groin or belly pain. Watch closely for changes in your health, and be sure to contact your doctor if:    · You want more information about MS or medicines.     · You have questions about alternative treatments. Do not use any other treatments without talking to your doctor first.   Where can you learn more? Go to http://www.gray.com/  Enter N224 in the search box to learn more about \"Multiple Sclerosis (MS): Care Instructions. \"  Current as of: August 4, 2020               Content Version: 12.8  © 3607-9228 [employer] Care instructions adapted under license by [employer] (which disclaims liability or warranty for this information).  If you have questions about a medical condition or this instruction, always ask your healthcare professional. [address] any warranty or liability for your use of this information. Body Mass Index: Care Instructions  Your Care Instructions     Body mass index (BMI) can help you see if your weight is raising your risk for health problems. It uses a formula to compare how much you weigh with how tall you are. · A BMI lower than 18.5 is considered underweight. · A BMI between 18.5 and 24.9 is considered healthy. · A BMI between 25 and 29.9 is considered overweight. A BMI of 30 or higher is considered obese. If your BMI is in the normal range, it means that you have a lower risk for weight-related health problems. If your BMI is in the overweight or obese range, you may be at increased risk for weight-related health problems, such as high blood pressure, heart disease, stroke, arthritis or joint pain, and diabetes. If your BMI is in the underweight range, you may be at increased risk for health problems such as fatigue, lower protection (immunity) against illness, muscle loss, bone loss, hair loss, and hormone problems. BMI is just one measure of your risk for weight-related health problems. You may be at higher risk for health problems if you are not active, you eat an unhealthy diet, or you drink too much alcohol or use tobacco products. Follow-up care is a key part of your treatment and safety. Be sure to make and go to all appointments, and call your doctor if you are having problems. It's also a good idea to know your test results and keep a list of the medicines you take. How can you care for yourself at home? · Practice healthy eating habits. This includes eating plenty of fruits, vegetables, whole grains, lean protein, and low-fat dairy. · If your doctor recommends it, get more exercise. Walking is a good choice. Bit by bit, increase the amount you walk every day.  Try for at least 30 minutes on most days of the week. · Do not smoke. Smoking can increase your risk for health problems. If you need help quitting, talk to your doctor about stop-smoking programs and medicines. These can increase your chances of quitting for good. · Limit alcohol to 2 drinks a day for men and 1 drink a day for women. Too much alcohol can cause health problems. If you have a BMI higher than 25  · Your doctor may do other tests to check your risk for weight-related health problems. This may include measuring the distance around your waist. A waist measurement of more than 40 inches in men or 35 inches in women can increase the risk of weight-related health problems. · Talk with your doctor about steps you can take to stay healthy or improve your health. You may need to make lifestyle changes to lose weight and stay healthy, such as changing your diet and getting regular exercise. If you have a BMI lower than 18.5  · Your doctor may do other tests to check your risk for health problems. · Talk with your doctor about steps you can take to stay healthy or improve your health. You may need to make lifestyle changes to gain or maintain weight and stay healthy, such as getting more healthy foods in your diet and doing exercises to build muscle. Where can you learn more? Go to http://www.[employer].com/  Enter S176 in the search box to learn more about \"Body Mass Index: Care Instructions. \"  Current as of: September 23, 2020               Content Version: 12.8  © 6622-3250 [employer]. Care instructions adapted under license by [employer] (which disclaims liability or warranty for this information). If you have questions about a medical condition or this instruction, always ask your healthcare professional. [place] [address] any warranty or liability for your use of this information. Learning About Vitamin D  Why is it important to get enough vitamin D?      Your body needs vitamin D to absorb calcium. Calcium keeps your bones and muscles, including your heart, healthy and strong. If your muscles don't get enough calcium, they can cramp, hurt, or feel weak. You may have long-term (chronic) muscle aches and pains. If you don't get enough vitamin D throughout life, you have an increased chance of having thin and brittle bones (osteoporosis) in your later years. Children who don't get enough vitamin D may not grow as much as others their age. They also have a chance of getting a rare disease called rickets. It causes weak bones. Vitamin D and calcium are added to many foods. And your body uses sunshine to make its own vitamin D. How much vitamin D do you need? The recommended daily allowance (RDA) for vitamin D is 600 IU (international units) every day for people ages 3 through 79. Adults 71 and older need 800 IU every day. Blood tests for vitamin D can check your vitamin D level. But there is no standard normal range used by all laboratories. You're likely getting enough vitamin D if your levels are in the range of 20 to 50 ng/mL. How can you get more vitamin D? Foods that contain vitamin D include:  · [place], tuna, and mackerel. These are some of the best foods to eat when you need to get more vitamin D.  · Cheese, egg yolks, and beef liver. These foods have vitamin D in small amounts. · Milk, soy drinks, orange juice, yogurt, margarine, and some kinds of cereal have vitamin D added to them. Some people don't make vitamin D as well as others. They may have to take extra care in getting enough vitamin D. Things that reduce how much vitamin D your body makes include:  · Dark skin, such as many  Americans have. · Age, especially if you are older than 72. · Digestive problems, such as Crohn's or celiac disease. · Liver and kidney disease. Some people who do not get enough vitamin D may need supplements.   Are there any risks from taking vitamin D?  · Too much vitamin D:  ? Can damage your kidneys. ? Can cause nausea and vomiting, constipation, and weakness. ? Raises the amount of calcium in your blood. If this happens, you can get confused or have an irregular heart rhythm. · Vitamin D may interact with other medicines. Tell your doctor about all of the medicines you take, including over-the-counter drugs, herbs, and pills. Tell your doctor about all of your current medical problems. Where can you learn more? Go to http://www.fontanez.com/  Enter Z230 in the search box to learn more about \"Learning About Vitamin D.\"  Current as of: December 17, 2020               Content Version: 12.8  © 4769-8897 [employer] Care instructions adapted under license by [employer] (which disclaims liability or warranty for this information). If you have questions about a medical condition or this instruction, always ask your healthcare professional. [place] [address] any warranty or liability for your use of this information. Follow-up and Dispositions    · Return in about 2 weeks (around 5/11/2021), or if symptoms worsen or fail to improve, for Discuss Test Results. I have reviewed the patient's medical history in detail and updated the computerized patient record. We had a prolonged discussion about these complex clinical issues and went over the various important aspects to consider. Patient expressed understanding with the diagnosis and plan and was given the opportunity to ask questions and receive answers to all questions. Advised patient to call back or return to office if symptoms do not improve, change in nature, or persist. Patient to schedule an appointment in 2 weeks to follow up from today's visit. Patient was given an after-visit summary or informed of MyChart Access which includes patient instructions, diagnoses, current medications, & vitals.

## 2021-04-29 NOTE — PROGRESS NOTES
Results were received and reviewed.  Patient has a follow-up appointment on 5- to discuss test results

## 2021-05-25 ENCOUNTER — OFFICE VISIT (OUTPATIENT)
Dept: INTERNAL MEDICINE CLINIC | Age: 46
End: 2021-05-25
Payer: OTHER GOVERNMENT

## 2021-05-25 VITALS
HEIGHT: 66 IN | HEART RATE: 64 BPM | TEMPERATURE: 97.5 F | DIASTOLIC BLOOD PRESSURE: 62 MMHG | OXYGEN SATURATION: 100 % | BODY MASS INDEX: 30.37 KG/M2 | RESPIRATION RATE: 16 BRPM | WEIGHT: 189 LBS | SYSTOLIC BLOOD PRESSURE: 108 MMHG

## 2021-05-25 DIAGNOSIS — J30.89 ENVIRONMENTAL AND SEASONAL ALLERGIES: ICD-10-CM

## 2021-05-25 DIAGNOSIS — G35 MULTIPLE SCLEROSIS (HCC): ICD-10-CM

## 2021-05-25 DIAGNOSIS — N30.00 ACUTE CYSTITIS WITHOUT HEMATURIA: ICD-10-CM

## 2021-05-25 DIAGNOSIS — E55.9 VITAMIN D DEFICIENCY: ICD-10-CM

## 2021-05-25 DIAGNOSIS — Z71.2 ENCOUNTER TO DISCUSS TEST RESULTS: Primary | ICD-10-CM

## 2021-05-25 LAB
BILIRUB UR QL STRIP: NEGATIVE
GLUCOSE UR-MCNC: NEGATIVE MG/DL
KETONES P FAST UR STRIP-MCNC: NEGATIVE MG/DL
PH UR STRIP: 7 [PH] (ref 4.6–8)
PROT UR QL STRIP: NEGATIVE
SP GR UR STRIP: 1.02 (ref 1–1.03)
UA UROBILINOGEN AMB POC: NORMAL (ref 0.2–1)
URINALYSIS CLARITY POC: CLEAR
URINALYSIS COLOR POC: YELLOW
URINE BLOOD POC: NEGATIVE
URINE LEUKOCYTES POC: NORMAL
URINE NITRITES POC: NEGATIVE

## 2021-05-25 PROCEDURE — 99214 OFFICE O/P EST MOD 30 MIN: CPT | Performed by: NURSE PRACTITIONER

## 2021-05-25 RX ORDER — NITROFURANTOIN 25; 75 MG/1; MG/1
100 CAPSULE ORAL 2 TIMES DAILY
Qty: 14 CAPSULE | Refills: 0 | Status: SHIPPED | OUTPATIENT
Start: 2021-05-25 | End: 2021-06-01

## 2021-05-25 NOTE — PROGRESS NOTES
Chief Complaint   Patient presents with    Results     1. Have you been to the ER, urgent care clinic since your last visit? Hospitalized since your last visit? No    2. Have you seen or consulted any other health care providers outside of the [address] [place] [address] since your last visit? Include any pap smears or colon screening.  No

## 2021-05-25 NOTE — PATIENT INSTRUCTIONS
Allergies: Care Instructions Your Care Instructions Allergies occur when your body's defense system (immune system) overreacts to certain substances. The immune system treats a harmless substance as if it were a harmful germ or virus. Many things can make this happen. These include pollens, medicine, food, dust, animal dander, and mold. Allergies can be mild or severe. Mild allergies can be managed with home treatment. But medicine may be needed to prevent problems. Managing your allergies is an important part of staying healthy. Your doctor may suggest that you have allergy testing to help find out what is causing your allergies. Severe allergies can cause reactions that affect your whole body (anaphylactic reactions). Your doctor may prescribe a shot of epinephrine to carry with you in case you have a severe reaction. Learn how to give yourself the shot and keep it with you at all times. Make sure it is not . Follow-up care is a key part of your treatment and safety. Be sure to make and go to all appointments, and call your doctor if you are having problems. It's also a good idea to know your test results and keep a list of the medicines you take. How can you care for yourself at home? · If you have been told by your doctor that dust or dust mites are causing your allergy, decrease the dust around your bed: 
? Wash sheets, pillowcases, and other bedding in hot water every week. ? Use dust-proof covers for pillows, duvets, and mattresses. Avoid plastic covers because they tear easily and do not \"breathe. \" Wash as instructed on the label. ? Do not use any blankets and pillows that you do not need. ? Use blankets that you can wash in your washing machine. ? Consider removing drapes and carpets, which attract and hold dust, from your bedroom. · If you are allergic to house dust and mites, do not use home humidifiers. Your doctor can suggest ways you can control dust and mites.  
· Look for signs of cockroaches. Cockroaches cause allergic reactions. Use cockroach baits to get rid of them. Then, clean your home well. Cockroaches like areas where grocery bags, newspapers, empty bottles, or cardboard boxes are stored. Do not keep these inside your home, and keep trash and food containers sealed. Seal off any spots where cockroaches might enter your home. · If you are allergic to mold, get rid of furniture, rugs, and drapes that smell musty. Check for mold in the bathroom. · If you are allergic to outdoor pollen or mold spores, use air-conditioning. Change or clean all filters every month. Keep windows closed. · If you are allergic to pollen, stay inside when pollen counts are high. Use a vacuum  with a HEPA filter or a double-thickness filter at least two times each week. · Stay inside when air pollution is bad. Avoid paint fumes, perfumes, and other strong odors. · Avoid conditions that make your allergies worse. Stay away from smoke. Do not smoke or let anyone else smoke in your house. Do not use fireplaces or wood-burning stoves. · If you are allergic to your pets, change the air filter in your furnace every month. Use high-efficiency filters. · If you are allergic to pet dander, keep pets outside or out of your bedroom. Old carpet and cloth furniture can hold a lot of animal dander. You may need to replace them. When should you call for help? Give an epinephrine shot if: 
  · You think you are having a severe allergic reaction.  
  · You have symptoms in more than one body area, such as mild nausea and an itchy mouth. After giving an epinephrine shot call 911, even if you feel better. Call 911 if: 
  · You have symptoms of a severe allergic reaction. These may include: 
? Sudden raised, red areas (hives) all over your body. ? Swelling of the throat, mouth, lips, or tongue. ? Trouble breathing. ? Passing out (losing consciousness).  Or you may feel very lightheaded or suddenly feel weak, confused, or restless.  
  · You have been given an epinephrine shot, even if you feel better. Call your doctor now or seek immediate medical care if: 
  · You have symptoms of an allergic reaction, such as: ? A rash or hives (raised, red areas on the skin). ? Itching. ? Swelling. ? Belly pain, nausea, or vomiting. Watch closely for changes in your health, and be sure to contact your doctor if: 
  · You do not get better as expected. Where can you learn more? Go to http://www.gray.com/ Enter H538 in the search box to learn more about \"Allergies: Care Instructions. \" Current as of: November 6, 2020               Content Version: 12.8 © 2006-2021 [employer]. Care instructions adapted under license by [facility] (which disclaims liability or warranty for this information). If you have questions about a medical condition or this instruction, always ask your healthcare professional. [place] [address] any warranty or liability for your use of this information. Multiple Sclerosis (MS): Care Instructions Your Care Instructions Multiple sclerosis, also called MS, is a disease that can affect the brain, spinal cord, and nerves to the eyes. MS can cause problems with muscle control and strength, vision, balance, feeling, and thinking. Whatever your symptoms are, taking medicine correctly and following your doctor's advice for home care can help you maintain your quality of life. Follow-up care is a key part of your treatment and safety. Be sure to make and go to all appointments, and call your doctor if you are having problems. It's also a good idea to know your test results and keep a list of the medicines you take. How can you care for yourself at home? General care · Take your medicines exactly as prescribed. Call your doctor if you think you are having a problem with your medicine.  
· Use a cane, walker, or scooter if your doctor suggests it. · Keep doing your normal activities as much as you can. · If you have problems urinating, press or tap your bladder area to help start urine flow. If you have trouble controlling your urine, plan your fluid intake and activities so that a toilet will be available when you need it. · Spend time with family and friends. Join a support group for people with MS if you want extra help. · Depression is common with this condition. Tell your doctor if you have trouble sleeping, are eating too much or are not hungry, or feel sad or tearful all the time. Depression can be treated with medicine and counseling. Diet and exercise · Eat a balanced diet. · If you have problems swallowing, change how and what you eat: ? Try thick drinks, such as milk shakes. They are easier to swallow than other fluids. ? Do not eat foods that crumble easily. These can cause choking. ? Use a  to prepare food. Soft foods need less chewing. ? Eat small meals often so that you do not get tired from eating larger meals. · Get exercise on most days. Work with your doctor to set up a program of walking, swimming, or other exercise that you are able to do. A physical therapist can teach you exercises if you cannot walk but can move your limbs and trunk. Or you can do exercises to help with coordination and balance. You can help improve muscle stiffness by doing exercises while lying in certain positions. When should you call for help? Call your doctor now or seek immediate medical care if: 
  · You have a change in symptoms.  
  · You fall or have another injury.  
  · You have symptoms of a urinary infection. For example: ? You have blood or pus in your urine. ? You have pain in your back just below your rib cage. This is called flank pain. ? You have a fever, chills, or body aches. ? It hurts to urinate. ? You have groin or belly pain.   
Watch closely for changes in your health, and be sure to contact your doctor if: 
  · You want more information about MS or medicines.  
  · You have questions about alternative treatments. Do not use any other treatments without talking to your doctor first.  
Where can you learn more? Go to http://www.gray.com/ Enter E492 in the search box to learn more about \"Multiple Sclerosis (MS): Care Instructions. \" Current as of: August 4, 2020               Content Version: 12.8 © 2006-2021 [employer]. Care instructions adapted under license by [employer] (which disclaims liability or warranty for this information). If you have questions about a medical condition or this instruction, always ask your healthcare professional. [address] any warranty or liability for your use of this information. Learning About Vitamin D Why is it important to get enough vitamin D? Your body needs vitamin D to absorb calcium. Calcium keeps your bones and muscles, including your heart, healthy and strong. If your muscles don't get enough calcium, they can cramp, hurt, or feel weak. You may have long-term (chronic) muscle aches and pains. If you don't get enough vitamin D throughout life, you have an increased chance of having thin and brittle bones (osteoporosis) in your later years. Children who don't get enough vitamin D may not grow as much as others their age. They also have a chance of getting a rare disease called rickets. It causes weak bones. Vitamin D and calcium are added to many foods. And your body uses sunshine to make its own vitamin D. How much vitamin D do you need? The recommended daily allowance (RDA) for vitamin D is 600 IU (international units) every day for people ages 3 through 79. Adults 71 and older need 800 IU every day. Blood tests for vitamin D can check your vitamin D level. But there is no standard normal range used by all laboratories.  You're likely getting enough vitamin D if your levels are in the range of 20 to 50 ng/mL. How can you get more vitamin D? Foods that contain vitamin D include: 
· [place], tuna, and mackerel. These are some of the best foods to eat when you need to get more vitamin D. 
· Cheese, egg yolks, and beef liver. These foods have vitamin D in small amounts. · Milk, soy drinks, orange juice, yogurt, margarine, and some kinds of cereal have vitamin D added to them. Some people don't make vitamin D as well as others. They may have to take extra care in getting enough vitamin D. Things that reduce how much vitamin D your body makes include: · Dark skin, such as many  Americans have. · Age, especially if you are older than 72. · Digestive problems, such as Crohn's or celiac disease. · Liver and kidney disease. Some people who do not get enough vitamin D may need supplements. Are there any risks from taking vitamin D? 
· Too much vitamin D: 
? Can damage your kidneys. ? Can cause nausea and vomiting, constipation, and weakness. ? Raises the amount of calcium in your blood. If this happens, you can get confused or have an irregular heart rhythm. · Vitamin D may interact with other medicines. Tell your doctor about all of the medicines you take, including over-the-counter drugs, herbs, and pills. Tell your doctor about all of your current medical problems. Where can you learn more? Go to http://www.gray.com/ Enter [id number] in the search box to learn more about \"Learning About Vitamin D.\" 
Current as of: December 17, 2020               Content Version: 12.8 © 3585-9774 [employer]. Care instructions adapted under license by [email or web address] (which disclaims liability or warranty for this information). If you have questions about a medical condition or this instruction, always ask your healthcare professional. [address] any warranty or liability for your use of this information.

## 2021-05-26 NOTE — PROGRESS NOTES
Subjective:  Dillan Newton is a 54 y.o. female and presents with multiple Sclerosis, UTI  and Vitamin D deficiency to discuss Test Results. Multiple Sclerosis Review:  Patient is presenting today for evaluation and treatment of multiple sclerosis, not currently in any distress. Patient denies visual symptoms including persistent visual blurring , subdued color perception and diplopia, sensory symptoms, motor symptoms, cerebellar symptoms, cranial nerve symptoms, urinary tract symptoms, cognitive symptoms, sexual symptoms, affective symptoms and constitutional symptoms. Urinary Tract Infection:  Patient complains of dysuria, frequency, urgency, abnormal smelling urine. Onset was a few days ago, gradually worsening since that time. Patient denies any complains of lower abdominal ache. There is not a history of trauma to the genital area. Patient does not have a history of recurrent UTI. Patient does not have a history of pyelonephritis.     VitaminD Deficiency Review  Patient presents with Vitamin D deficiency. Patient denies symptoms of bone pain, muscle weakness, Fatigue or other neurological symptoms. Labs were ordered at patient's last office visit to assess the need for continued  Vitamin D replacement.      Review of Systems  Constitutional: negative for fevers, chills, anorexia and weight loss  Eyes:   negative for visual disturbance and irritation  ENT:   negative for tinnitus,sore throat,nasal congestion,ear pains. hoarseness  Respiratory:  negative for cough, hemoptysis, dyspnea,wheezing  CV:   negative for chest pain, palpitations, lower extremity edema  GI:   negative for nausea, vomiting, diarrhea, abdominal pain,melena  Endo:               negative for polyuria,polydipsia,polyphagia,heat intolerance  Genitourinary: positive for frequency and hematuria  Integument:  negative for rash and pruritus  Hematologic:  negative for easy bruising and gum/nose bleeding  Musculoskel: negative for myalgias, arthralgias, back pain, muscle weakness, joint pain  Neurological:  negative for headaches, dizziness, vertigo, memory problems and gait   Behavl/Psych: negative for feelings of anxiety, depression, mood changes    Past Medical History:   Diagnosis Date    History of shingles     MS (multiple sclerosis) ([facility] [address]      Past Surgical History:   Procedure Laterality Date    HX HYSTERECTOMY  02/2016    left ovaries    HX ORTHOPAEDIC  2001, 2003    bilat feet, bunions removed, bones removed from pinky     Social History     Socioeconomic History    Marital status:      Spouse name: Not on file    Number of children: Not on file    Years of education: Not on file    Highest education level: Not on file   Tobacco Use    Smoking status: Never Smoker    Smokeless tobacco: Never Used   Vaping Use    Vaping Use: Never used   Substance and Sexual Activity    Alcohol use: No    Drug use: No    Sexual activity: Not Currently     Social Determinants of Health     Financial Resource Strain:     Difficulty of Paying Living Expenses:    Food Insecurity:     Worried About Running Out of Food in the Last Year:     Ran Out of Food in the Last Year:    Transportation Needs:     Lack of Transportation (Medical):      Lack of Transportation (Non-Medical):    Physical Activity:     Days of Exercise per Week:     Minutes of Exercise per Session:    Stress:     Feeling of Stress :    Social Connections:     Frequency of Communication with Friends and Family:     Frequency of Social Gatherings with Friends and Family:     Attends Gnosticism Services:     Active Member of Clubs or Organizations:     Attends Club or Organization Meetings:     Marital Status:      Family History   Problem Relation Age of Onset    Hypertension Mother     Elevated Lipids Mother     Diabetes Paternal Grandmother     Diabetes Paternal Grandfather      Current Outpatient Medications   Medication Sig Dispense Refill    nitrofurantoin, macrocrystal-monohydrate, (MACROBID) 100 mg capsule Take 1 Capsule by mouth two (2) times a day for 7 days. 14 Capsule 0    ocrelizumab (OCREVUS IV) by IntraVENous route.  cetirizine (ZYRTEC) 10 mg tablet Take 1 Tab by mouth nightly. 30 Tab 0    ibuprofen (MOTRIN) 200 mg tablet 1 tab by mouth daily just prior to injection as needed      ergocalciferol (ERGOCALCIFEROL) 50,000 unit capsule Take 1 Cap by mouth every seven (7) days. 15 Cap 1     Allergies   Allergen Reactions    Toradol [Ketorolac] Other (comments)     hallucinate       Objective:  Visit Vitals  /62 (BP 1 Location: Left upper arm, BP Patient Position: Semi fowlers, BP Cuff Size: Adult)   Pulse 64   Temp 97.5 °F (36.4 °C) (Oral)   Resp 16   Ht 5' 6\" (1.676 m)   Wt 189 lb (85.7 kg)   SpO2 100%   BMI 30.51 kg/m²     Physical Exam:   General appearance - alert, well appearing, and in no distress  Mental status - alert, oriented to person, place, and time  EYE-ROSARIO, EOMI, corneas normal, no foreign bodies  ENT-ENT exam normal, no neck nodes or sinus tenderness  Nose - normal and patent, no erythema, discharge or polyps  Mouth - mucous membranes moist, pharynx normal without lesions  Neck - supple, no significant adenopathy   Chest - clear to auscultation, no wheezes, rales or rhonchi, symmetric air entry   Heart - normal rate, regular rhythm, normal S1, S2, no murmurs, rubs, clicks or gallops   Abdomen - soft, nontender, nondistended, no masses or organomegaly  Lymph- no adenopathy palpable  Ext-peripheral pulses normal, no pedal edema, no clubbing or cyanosis  Skin-Warm and dry.  no hyperpigmentation, vitiligo, or suspicious lesions  Neuro -alert, oriented, normal speech, no focal findings or movement disorder noted  Musculoskeletal-normal C-spine, no tenderness, full ROM without pain  Feet-no nail deformities or callus formation with good pulses noted      Results for orders placed or performed in visit on 05/25/21   CULTURE, URINE    Specimen: Clean catch; Urine   Result Value Ref Range    Special Requests: NO SPECIAL REQUESTS      [place] Count 50300  COLONIES/mL        Culture result: GRAM NEGATIVE RODS (A)         Assessment/Plan:    ICD-10-CM ICD-9-CM    1. Encounter to discuss test results  Z71.2 V65.49    2. Multiple sclerosis ([facility] [address]  G35 340    3. Vitamin D deficiency  E55.9 268.9    4. Environmental and seasonal allergies  J30.89 477.8    5. Acute cystitis without hematuria  N30.00 595.0 nitrofurantoin, macrocrystal-monohydrate, (MACROBID) 100 mg capsule      CULTURE, URINE      CULTURE, URINE   6. Body mass index (BMI) of 30.0 to 30.9 in adult  Z68.30 V85.30      Orders Placed This Encounter    CULTURE, URINE     Standing Status:   Future     Number of Occurrences:   1     Standing Expiration Date:   5/25/2022    nitrofurantoin, macrocrystal-monohydrate, (MACROBID) 100 mg capsule     Sig: Take 1 Capsule by mouth two (2) times a day for 7 days. Dispense:  14 Capsule     Refill:  0     Patient Instructions          Allergies: Care Instructions  Your Care Instructions     Allergies occur when your body's defense system (immune system) overreacts to certain substances. The immune system treats a harmless substance as if it were a harmful germ or virus. Many things can make this happen. These include pollens, medicine, food, dust, animal dander, and mold. Allergies can be mild or severe. Mild allergies can be managed with home treatment. But medicine may be needed to prevent problems. Managing your allergies is an important part of staying healthy. Your doctor may suggest that you have allergy testing to help find out what is causing your allergies. Severe allergies can cause reactions that affect your whole body (anaphylactic reactions). Your doctor may prescribe a shot of epinephrine to carry with you in case you have a severe reaction. Learn how to give yourself the shot and keep it with you at all times.  Make sure it is not . Follow-up care is a key part of your treatment and safety. Be sure to make and go to all appointments, and call your doctor if you are having problems. It's also a good idea to know your test results and keep a list of the medicines you take. How can you care for yourself at home? · If you have been told by your doctor that dust or dust mites are causing your allergy, decrease the dust around your bed:  ? Wash sheets, pillowcases, and other bedding in hot water every week. ? Use dust-proof covers for pillows, duvets, and mattresses. Avoid plastic covers because they tear easily and do not \"breathe. \" Wash as instructed on the label. ? Do not use any blankets and pillows that you do not need. ? Use blankets that you can wash in your washing machine. ? Consider removing drapes and carpets, which attract and hold dust, from your bedroom. · If you are allergic to house dust and mites, do not use home humidifiers. Your doctor can suggest ways you can control dust and mites. · Look for signs of cockroaches. Cockroaches cause allergic reactions. Use cockroach baits to get rid of them. Then, clean your home well. Cockroaches like areas where grocery bags, newspapers, empty bottles, or cardboard boxes are stored. Do not keep these inside your home, and keep trash and food containers sealed. Seal off any spots where cockroaches might enter your home. · If you are allergic to mold, get rid of furniture, rugs, and drapes that smell musty. Check for mold in the bathroom. · If you are allergic to outdoor pollen or mold spores, use air-conditioning. Change or clean all filters every month. Keep windows closed. · If you are allergic to pollen, stay inside when pollen counts are high. Use a vacuum  with a HEPA filter or a double-thickness filter at least two times each week. · Stay inside when air pollution is bad. Avoid paint fumes, perfumes, and other strong odors.   · Avoid conditions that make your allergies worse. Stay away from smoke. Do not smoke or let anyone else smoke in your house. Do not use fireplaces or wood-burning stoves. · If you are allergic to your pets, change the air filter in your furnace every month. Use high-efficiency filters. · If you are allergic to pet dander, keep pets outside or out of your bedroom. Old carpet and cloth furniture can hold a lot of animal dander. You may need to replace them. When should you call for help? Give an epinephrine shot if:    · You think you are having a severe allergic reaction.     · You have symptoms in more than one body area, such as mild nausea and an itchy mouth. After giving an epinephrine shot call 911, even if you feel better. Call 911 if:    · You have symptoms of a severe allergic reaction. These may include:  ? Sudden raised, red areas (hives) all over your body. ? Swelling of the throat, mouth, lips, or tongue. ? Trouble breathing. ? Passing out (losing consciousness). Or you may feel very lightheaded or suddenly feel weak, confused, or restless.     · You have been given an epinephrine shot, even if you feel better. Call your doctor now or seek immediate medical care if:    · You have symptoms of an allergic reaction, such as:  ? A rash or hives (raised, red areas on the skin). ? Itching. ? Swelling. ? Belly pain, nausea, or vomiting. Watch closely for changes in your health, and be sure to contact your doctor if:    · You do not get better as expected. Where can you learn more? Go to http://www.[employer].com/  Enter W171 in the search box to learn more about \"Allergies: Care Instructions. \"  Current as of: November 6, 2020               Content Version: 12.8  © 6462-2634 [employer]. Care instructions adapted under license by [employer] (which disclaims liability or warranty for this information).  If you have questions about a medical condition or this instruction, always ask your healthcare professional. [address] any warranty or liability for your use of this information. Multiple Sclerosis (MS): Care Instructions  Your Care Instructions  Multiple sclerosis, also called MS, is a disease that can affect the brain, spinal cord, and nerves to the eyes. MS can cause problems with muscle control and strength, vision, balance, feeling, and thinking. Whatever your symptoms are, taking medicine correctly and following your doctor's advice for home care can help you maintain your quality of life. Follow-up care is a key part of your treatment and safety. Be sure to make and go to all appointments, and call your doctor if you are having problems. It's also a good idea to know your test results and keep a list of the medicines you take. How can you care for yourself at home? General care  · Take your medicines exactly as prescribed. Call your doctor if you think you are having a problem with your medicine. · Use a cane, walker, or scooter if your doctor suggests it. · Keep doing your normal activities as much as you can. · If you have problems urinating, press or tap your bladder area to help start urine flow. If you have trouble controlling your urine, plan your fluid intake and activities so that a toilet will be available when you need it. · Spend time with family and friends. Join a support group for people with MS if you want extra help. · Depression is common with this condition. Tell your doctor if you have trouble sleeping, are eating too much or are not hungry, or feel sad or tearful all the time. Depression can be treated with medicine and counseling. Diet and exercise  · Eat a balanced diet. · If you have problems swallowing, change how and what you eat:  ? Try thick drinks, such as milk shakes. They are easier to swallow than other fluids. ? Do not eat foods that crumble easily. These can cause choking. ? Use a  to prepare food.  Soft foods need less chewing. ? Eat small meals often so that you do not get tired from eating larger meals. · Get exercise on most days. Work with your doctor to set up a program of walking, swimming, or other exercise that you are able to do. A physical therapist can teach you exercises if you cannot walk but can move your limbs and trunk. Or you can do exercises to help with coordination and balance. You can help improve muscle stiffness by doing exercises while lying in certain positions. When should you call for help? Call your doctor now or seek immediate medical care if:    · You have a change in symptoms.     · You fall or have another injury.     · You have symptoms of a urinary infection. For example:  ? You have blood or pus in your urine. ? You have pain in your back just below your rib cage. This is called flank pain. ? You have a fever, chills, or body aches. ? It hurts to urinate. ? You have groin or belly pain. Watch closely for changes in your health, and be sure to contact your doctor if:    · You want more information about MS or medicines.     · You have questions about alternative treatments. Do not use any other treatments without talking to your doctor first.   Where can you learn more? Go to http://www.gray.com/  Enter W728 in the search box to learn more about \"Multiple Sclerosis (MS): Care Instructions. \"  Current as of: August 4, 2020               Content Version: 12.8  © 1473-0547 [employer]. Care instructions adapted under license by [employer] (which disclaims liability or warranty for this information). If you have questions about a medical condition or this instruction, always ask your healthcare professional. [address] any warranty or liability for your use of this information. Learning About Vitamin D  Why is it important to get enough vitamin D?      Your body needs vitamin D to absorb calcium. Calcium keeps your bones and muscles, including your heart, healthy and strong. If your muscles don't get enough calcium, they can cramp, hurt, or feel weak. You may have long-term (chronic) muscle aches and pains. If you don't get enough vitamin D throughout life, you have an increased chance of having thin and brittle bones (osteoporosis) in your later years. Children who don't get enough vitamin D may not grow as much as others their age. They also have a chance of getting a rare disease called rickets. It causes weak bones. Vitamin D and calcium are added to many foods. And your body uses sunshine to make its own vitamin D. How much vitamin D do you need? The recommended daily allowance (RDA) for vitamin D is 600 IU (international units) every day for people ages 3 through 79. Adults 71 and older need 800 IU every day. Blood tests for vitamin D can check your vitamin D level. But there is no standard normal range used by all laboratories. You're likely getting enough vitamin D if your levels are in the range of 20 to 50 ng/mL. How can you get more vitamin D? Foods that contain vitamin D include:  · Benton, tuna, and mackerel. These are some of the best foods to eat when you need to get more vitamin D.  · Cheese, egg yolks, and beef liver. These foods have vitamin D in small amounts. · Milk, soy drinks, orange juice, yogurt, margarine, and some kinds of cereal have vitamin D added to them. Some people don't make vitamin D as well as others. They may have to take extra care in getting enough vitamin D. Things that reduce how much vitamin D your body makes include:  · Dark skin, such as many  Americans have. · Age, especially if you are older than 72. · Digestive problems, such as Crohn's or celiac disease. · Liver and kidney disease. Some people who do not get enough vitamin D may need supplements.   Are there any risks from taking vitamin D?  · Too much vitamin D:  ? Can damage your kidneys. ? Can cause nausea and vomiting, constipation, and weakness. ? Raises the amount of calcium in your blood. If this happens, you can get confused or have an irregular heart rhythm. · Vitamin D may interact with other medicines. Tell your doctor about all of the medicines you take, including over-the-counter drugs, herbs, and pills. Tell your doctor about all of your current medical problems. Where can you learn more? Go to http://www.[employer].com/  Enter Q148 in the search box to learn more about \"Learning About Vitamin D.\"  Current as of: December 17, 2020               Content Version: 12.8  © 6165-8786 [employer]. Care instructions adapted under license by [employer] (which disclaims liability or warranty for this information). If you have questions about a medical condition or this instruction, always ask your healthcare professional. [place] [address] any warranty or liability for your use of this information. Follow-up and Dispositions    · Return in about 3 months (around 8/25/2021), or if symptoms worsen or fail to improve, for 3 month F/U and Labs (MS, Allergies, Vit D). I have reviewed the patient's medical history in detail and updated the computerized patient record. We had a prolonged discussion about these complex clinical issues and went over the various important aspects to consider. Patient expressed understanding with the diagnosis and plan and was given the opportunity to ask questions and receive answers to all questions. Advised patient to call back or return to office if symptoms do not improve, change in nature, or persist. Patient to schedule an appointment in 3 months to follow up from today's visit. Patient was given an after-visit summary or informed of [employer] which includes patient instructions, diagnoses, current medications, & vitals.

## 2021-05-27 LAB
BACTERIA SPEC CULT: ABNORMAL
CC UR VC: ABNORMAL
SERVICE CMNT-IMP: ABNORMAL

## 2021-10-20 ENCOUNTER — OFFICE VISIT (OUTPATIENT)
Dept: INTERNAL MEDICINE CLINIC | Age: 46
End: 2021-10-20
Payer: OTHER GOVERNMENT

## 2021-10-20 VITALS
SYSTOLIC BLOOD PRESSURE: 108 MMHG | DIASTOLIC BLOOD PRESSURE: 76 MMHG | RESPIRATION RATE: 16 BRPM | WEIGHT: 193.3 LBS | HEART RATE: 74 BPM | OXYGEN SATURATION: 100 % | TEMPERATURE: 96.3 F | HEIGHT: 66 IN | BODY MASS INDEX: 31.07 KG/M2

## 2021-10-20 DIAGNOSIS — Z00.00 ENCOUNTER FOR ROUTINE ADULT MEDICAL EXAMINATION: Primary | ICD-10-CM

## 2021-10-20 DIAGNOSIS — D50.9 IRON DEFICIENCY ANEMIA, UNSPECIFIED IRON DEFICIENCY ANEMIA TYPE: ICD-10-CM

## 2021-10-20 DIAGNOSIS — N89.8 VAGINAL DISCHARGE: ICD-10-CM

## 2021-10-20 DIAGNOSIS — N89.8 VAGINAL ODOR: ICD-10-CM

## 2021-10-20 DIAGNOSIS — J30.89 ENVIRONMENTAL AND SEASONAL ALLERGIES: ICD-10-CM

## 2021-10-20 DIAGNOSIS — E55.9 VITAMIN D DEFICIENCY: ICD-10-CM

## 2021-10-20 DIAGNOSIS — Z12.11 SCREEN FOR COLON CANCER: ICD-10-CM

## 2021-10-20 DIAGNOSIS — G35 MULTIPLE SCLEROSIS (HCC): ICD-10-CM

## 2021-10-20 PROCEDURE — 99215 OFFICE O/P EST HI 40 MIN: CPT | Performed by: NURSE PRACTITIONER

## 2021-10-20 RX ORDER — METRONIDAZOLE 500 MG/1
500 TABLET ORAL 2 TIMES DAILY
Qty: 14 TABLET | Refills: 0 | Status: SHIPPED | OUTPATIENT
Start: 2021-10-20 | End: 2021-10-27

## 2021-10-20 RX ORDER — CETIRIZINE HCL 10 MG
10 TABLET ORAL
Qty: 30 TABLET | Refills: 0 | Status: SHIPPED | OUTPATIENT
Start: 2021-10-20

## 2021-10-20 NOTE — PATIENT INSTRUCTIONS
Allergies: Care Instructions  Overview     Allergies occur when your body's defense system (immune system) overreacts to certain substances. The immune system treats a harmless substance as if it were a harmful germ or virus. Many things can make this happen. These include pollens, medicine, food, dust, animal dander, and mold. Allergies can be mild or severe. Mild allergies can be managed with home treatment. But medicine may be needed to prevent problems. Managing your allergies is an important part of staying healthy. Your doctor may suggest that you have allergy testing to help find out what is causing your allergies. Severe allergies can cause reactions that affect your whole body (anaphylactic reactions). Your doctor may prescribe a shot of epinephrine to carry with you in case you have a severe reaction. Learn how to give yourself the shot and keep it with you at all times. Make sure it is not . Follow-up care is a key part of your treatment and safety. Be sure to make and go to all appointments, and call your doctor if you are having problems. It's also a good idea to know your test results and keep a list of the medicines you take. How can you care for yourself at home? · If you have been told by your doctor that dust or dust mites are causing your allergy, decrease the dust around your bed:  ? Wash sheets, pillowcases, and other bedding in hot water every week. ? Use dust-proof covers for pillows, duvets, and mattresses. Avoid plastic covers because they tear easily and do not \"breathe. \" Wash as instructed on the label. ? Do not use any blankets and pillows that you do not need. ? Use blankets that you can wash in your washing machine. ? Consider removing drapes and carpets, which attract and hold dust, from your bedroom. · If you are allergic to house dust and mites, do not use home humidifiers. Your doctor can suggest ways you can control dust and mites.   · Look for signs of cockroaches. Cockroaches cause allergic reactions. Use cockroach baits to get rid of them. Then, clean your home well. Cockroaches like areas where grocery bags, newspapers, empty bottles, or cardboard boxes are stored. Do not keep these inside your home, and keep trash and food containers sealed. Seal off any spots where cockroaches might enter your home. · If you are allergic to mold, get rid of furniture, rugs, and drapes that smell musty. Check for mold in the bathroom. · If you are allergic to outdoor pollen or mold spores, use air-conditioning. Change or clean all filters every month. Keep windows closed. · If you are allergic to pollen, stay inside when pollen counts are high. Use a vacuum  with a HEPA filter or a double-thickness filter at least two times each week. · Stay inside when air pollution is bad. Avoid paint fumes, perfumes, and other strong odors. · Avoid conditions that make your allergies worse. Stay away from smoke. Do not smoke or let anyone else smoke in your house. Do not use fireplaces or wood-burning stoves. · If you are allergic to your pets, change the air filter in your furnace every month. Use high-efficiency filters. · If you are allergic to pet dander, keep pets outside or out of your bedroom. Old carpet and cloth furniture can hold a lot of animal dander. You may need to replace them. When should you call for help? Give an epinephrine shot if:    · You think you are having a severe allergic reaction.     · You have symptoms in more than one body area, such as mild nausea and an itchy mouth. After giving an epinephrine shot call 911, even if you feel better. Call 911 if:    · You have symptoms of a severe allergic reaction. These may include:  ? Sudden raised, red areas (hives) all over your body. ? Swelling of the throat, mouth, lips, or tongue. ? Trouble breathing. ? Passing out (losing consciousness).  Or you may feel very lightheaded or suddenly feel weak, confused, or restless.     · You have been given an epinephrine shot, even if you feel better. Call your doctor now or seek immediate medical care if:    · You have symptoms of an allergic reaction, such as:  ? A rash or hives (raised, red areas on the skin). ? Itching. ? Swelling. ? Belly pain, nausea, or vomiting. Watch closely for changes in your health, and be sure to contact your doctor if:    · You do not get better as expected. Where can you learn more? Go to http://www.fontanez.com/  Enter W171 in the search box to learn more about \"Allergies: Care Instructions. \"  Current as of: February 10, 2021               Content Version: 13.0  © 2006-2021 [employer]. Care instructions adapted under license by [employer] (which disclaims liability or warranty for this information). If you have questions about a medical condition or this instruction, always ask your healthcare professional. [place] [address] any warranty or liability for your use of this information. Colon Cancer Screening: Care Instructions  Overview     Colorectal cancer occurs in the colon or rectum. That's the lower part of your digestive system. It often starts in small growths called polyps in the colon or rectum. Polyps are usually found with screening tests. Depending on the type of test, any polyps found may be removed during the tests. Colorectal cancer usually does not cause symptoms at first. But regular tests can help find it early, before it spreads and becomes harder to treat. Your risk for colorectal cancer gets higher as you get older. Experts recommend starting screening at age 39 for people who are at average risk. Talk with your doctor about your risk and when to start and stop screening. You may have one of several tests. Follow-up care is a key part of your treatment and safety.  Be sure to make and go to all appointments, and call your doctor if you are having problems. It's also a good idea to know your test results and keep a list of the medicines you take. What are the main screening tests for colon cancer? The screening tests are:  Stool tests. These include the guaiac fecal occult blood test (gFOBT), the fecal immunochemical test (FIT), and the combined fecal immunochemical test and stool DNA test (FIT-DNA). These tests check stool samples for signs of cancer. If your test is positive, you will need to have a colonoscopy. Sigmoidoscopy. This test lets your doctor look at the lining of your rectum and the lowest part of your colon. Your doctor uses a lighted tube called a sigmoidoscope. This test can't find cancers or polyps in the upper part of your colon. In some cases, polyps that are found can be removed. But if your doctor finds polyps, you will need to have a colonoscopy to check the upper part of your colon. Colonoscopy. This test lets your doctor look at the lining of your rectum and your entire colon. The doctor uses a thin, flexible tool called a colonoscope. It can also be used to remove polyps or get a tissue sample (biopsy). A less common test is CT colonography (CTC). It's also called virtual colonoscopy. Who should be screened for colorectal cancer? Your risk for colorectal cancer gets higher as you get older. Experts recommend starting screening at age 39 for people who are at average risk. Talk with your doctor about your risk and when to start and stop screening. How often you need screening depends on the type of test you get:  Stool tests. Every year for FIT or gFOBT. Every 1 to 3 years for sDNA, also called FIT-DNA. Tests that look inside the colon. Every 5 years for sigmoidoscopy. (If you do the FIT test every year, you can get this test every 10 years.)   Every 5 years for CT colonography (virtual colonoscopy). Every 10 years for colonoscopy.   Experts agree that people at higher risk may need to be tested sooner and more often. This includes people who have a strong family history of colon cancer. Talk to your doctor about which test is best for you and when to be tested. When should you call for help? Watch closely for changes in your health, and be sure to contact your doctor if:    · You have any changes in your bowel habits.     · You have any problems. Where can you learn more? Go to http://www.gray.com/  Enter M541 in the search box to learn more about \"Colon Cancer Screening: Care Instructions. \"  Current as of: December 17, 2020               Content Version: 13.0  © 8685-8269 [employer]. Care instructions adapted under license by [employer] (which disclaims liability or warranty for this information). If you have questions about a medical condition or this instruction, always ask your healthcare professional. [place] [address] any warranty or liability for your use of this information. Multiple Sclerosis (MS): Care Instructions  Your Care Instructions  Multiple sclerosis, also called MS, is a disease that can affect the brain, spinal cord, and nerves to the eyes. MS can cause problems with muscle control and strength, vision, balance, feeling, and thinking. Whatever your symptoms are, taking medicine correctly and following your doctor's advice for home care can help you maintain your quality of life. Follow-up care is a key part of your treatment and safety. Be sure to make and go to all appointments, and call your doctor if you are having problems. It's also a good idea to know your test results and keep a list of the medicines you take. How can you care for yourself at home? General care  · Take your medicines exactly as prescribed. Call your doctor if you think you are having a problem with your medicine. · Use a cane, walker, or scooter if your doctor suggests it.   · Keep doing your normal activities as much as you can.  · If you have problems urinating, press or tap your bladder area to help start urine flow. If you have trouble controlling your urine, plan your fluid intake and activities so that a toilet will be available when you need it. · Spend time with family and friends. Join a support group for people with MS if you want extra help. · Depression is common with this condition. Tell your doctor if you have trouble sleeping, are eating too much or are not hungry, or feel sad or tearful all the time. Depression can be treated with medicine and counseling. Diet and exercise  · Eat a balanced diet. · If you have problems swallowing, change how and what you eat:  ? Try thick drinks, such as milk shakes. They are easier to swallow than other fluids. ? Do not eat foods that crumble easily. These can cause choking. ? Use a  to prepare food. Soft foods need less chewing. ? Eat small meals often so that you do not get tired from eating larger meals. · Get exercise on most days. Work with your doctor to set up a program of walking, swimming, or other exercise that you are able to do. A physical therapist can teach you exercises if you cannot walk but can move your limbs and trunk. Or you can do exercises to help with coordination and balance. You can help improve muscle stiffness by doing exercises while lying in certain positions. When should you call for help? Call your doctor now or seek immediate medical care if:    · You have a change in symptoms.     · You fall or have another injury.     · You have symptoms of a urinary infection. For example:  ? You have blood or pus in your urine. ? You have pain in your back just below your rib cage. This is called flank pain. ? You have a fever, chills, or body aches. ? It hurts to urinate. ? You have groin or belly pain.    Watch closely for changes in your health, and be sure to contact your doctor if:    · You want more information about MS or medicines.     · You have questions about alternative treatments. Do not use any other treatments without talking to your doctor first.   Where can you learn more? Go to http://www.gray.com/  Enter K555 in the search box to learn more about \"Multiple Sclerosis (MS): Care Instructions. \"  Current as of: April 8, 2021               Content Version: 13.0  © 6505-7307 [employer]. Care instructions adapted under license by [employer] (which disclaims liability or warranty for this information). If you have questions about a medical condition or this instruction, always ask your healthcare professional. [address] any warranty or liability for your use of this information. Learning About Vitamin D  Why is it important to get enough vitamin D? Your body needs vitamin D to absorb calcium. Calcium keeps your bones and muscles, including your heart, healthy and strong. If your muscles don't get enough calcium, they can cramp, hurt, or feel weak. You may have long-term (chronic) muscle aches and pains. If you don't get enough vitamin D throughout life, you have an increased chance of having thin and brittle bones (osteoporosis) in your later years. Children who don't get enough vitamin D may not grow as much as others their age. They also have a chance of getting a rare disease called rickets. It causes weak bones. Vitamin D and calcium are added to many foods. And your body uses sunshine to make its own vitamin D. How much vitamin D do you need? The recommended daily allowance (RDA) for vitamin D is 600 IU (international units) every day for people ages 3 through 79. Adults 71 and older need 800 IU every day. Blood tests for vitamin D can check your vitamin D level. But there is no standard normal range used by all laboratories. You're likely getting enough vitamin D if your levels are in the range of 20 to 50 ng/mL.   How can you get more vitamin D?  Foods that contain vitamin D include:  · [place], tuna, and mackerel. These are some of the best foods to eat when you need to get more vitamin D.  · Cheese, egg yolks, and beef liver. These foods have vitamin D in small amounts. · Milk, soy drinks, orange juice, yogurt, margarine, and some kinds of cereal have vitamin D added to them. Some people don't make vitamin D as well as others. They may have to take extra care in getting enough vitamin D. Things that reduce how much vitamin D your body makes include:  · Dark skin, such as many  Americans have. · Age, especially if you are older than 72. · Digestive problems, such as Crohn's or celiac disease. · Liver and kidney disease. Some people who do not get enough vitamin D may need supplements. Are there any risks from taking vitamin D?  · Too much vitamin D:  ? Can damage your kidneys. ? Can cause nausea and vomiting, constipation, and weakness. ? Raises the amount of calcium in your blood. If this happens, you can get confused or have an irregular heart rhythm. · Vitamin D may interact with other medicines. Tell your doctor about all of the medicines you take, including over-the-counter drugs, herbs, and pills. Tell your doctor about all of your current medical problems. Where can you learn more? Go to http://www.Numerate.com/  Enter N179 in the search box to learn more about \"Learning About Vitamin D.\"  Current as of: December 17, 2020               Content Version: 13.0  © 3730-6944 [employer]. Care instructions adapted under license by [employer] (which disclaims liability or warranty for this information). If you have questions about a medical condition or this instruction, always ask your healthcare professional. [place] [address] any warranty or liability for your use of this information.

## 2021-10-23 LAB
A VAGINAE DNA VAG QL NAA+PROBE: ABNORMAL SCORE
BVAB2 DNA VAG QL NAA+PROBE: ABNORMAL SCORE
C ALBICANS DNA VAG QL NAA+PROBE: NEGATIVE
C GLABRATA DNA VAG QL NAA+PROBE: NEGATIVE
MEGA1 DNA VAG QL NAA+PROBE: ABNORMAL SCORE
SPECIMEN SOURCE: ABNORMAL
T VAGINALIS RRNA SPEC QL NAA+PROBE: NEGATIVE

## 2021-10-25 NOTE — PROGRESS NOTES
Subjective:  Kerry Perla is a 51 y.o. female and presents with Multiple Sclerosis, Anemia, Allergies and Vitamin D deficiency to discuss Test Results. Patient is also presenting with unrelated complaints of having  Vaginal Discharge (ODOR, CAROLINE AFTER INTERORSE)  . Multiple Sclerosis Review:  Patient is presenting today for evaluation and treatment of multiple sclerosis, not currently in any distress. Patient denies visual symptoms including persistent visual blurring , subdued color perception and diplopia, sensory symptoms, motor symptoms, cerebellar symptoms, cranial nerve symptoms, urinary tract symptoms, cognitive symptoms, sexual symptoms, affective symptoms and constitutional symptoms. Anemia  Patient presents for  evaluation of anemia. Anemia was found by routine CBC. It has been present for several months. Associated signs & symptoms: fatigue    Seasonal/Environmental Allergies Review  Patient presents for follow up evaluation of allergy symptoms. She reports having nasal congestion, rhinorrhea, sneezing, eye itching, watery eyes. Precipitants include pollen. Treatment in the past has included intranasal steroids. Treatment currently includes intranasal steroids and antihistamines which are effective in the patient's opinion.     VitaminD Deficiency Review  Patient presents with Vitamin D deficiency. Patient denies symptoms of bone pain, muscle weakness, Fatigue or other neurological symptoms. Labs were ordered at patient's last office visit to assess the need for continued  Vitamin D replacement.      Review of Systems  Constitutional: negative for fevers, chills, anorexia and weight loss  Eyes:   negative for visual disturbance and irritation  ENT:   negative for tinnitus,sore throat,nasal congestion,ear pains. hoarseness  Respiratory:  negative for cough, hemoptysis, dyspnea,wheezing  CV:   negative for chest pain, palpitations, lower extremity edema  GI:   negative for nausea, vomiting, diarrhea, abdominal pain,melena  Endo:               negative for polyuria,polydipsia,polyphagia,heat intolerance  Genitourinary: negative for frequency, dysuria and hematuria  Integument:  negative for rash and pruritus  Hematologic:  negative for easy bruising and gum/nose bleeding  Musculoskel: negative for myalgias, arthralgias, back pain, muscle weakness, joint pain  Neurological:  negative for headaches, dizziness, vertigo, memory problems and gait   Behavl/Psych: negative for feelings of anxiety, depression, mood changes    Past Medical History:   Diagnosis Date    History of shingles     MS (multiple sclerosis) ([facility] [address]      Past Surgical History:   Procedure Laterality Date    HX HYSTERECTOMY  02/2016    left ovaries    HX ORTHOPAEDIC  2001, 2003    bilat feet, bunions removed, bones removed from pinky     Social History     Socioeconomic History    Marital status:      Spouse name: Not on file    Number of children: Not on file    Years of education: Not on file    Highest education level: Not on file   Tobacco Use    Smoking status: Never Smoker    Smokeless tobacco: Never Used   Vaping Use    Vaping Use: Never used   Substance and Sexual Activity    Alcohol use: No    Drug use: No    Sexual activity: Not Currently     Social Determinants of Health     Financial Resource Strain:     Difficulty of Paying Living Expenses:    Food Insecurity:     Worried About Running Out of Food in the Last Year:     Ran Out of Food in the Last Year:    Transportation Needs:     Lack of Transportation (Medical):      Lack of Transportation (Non-Medical):    Physical Activity:     Days of Exercise per Week:     Minutes of Exercise per Session:    Stress:     Feeling of Stress :    Social Connections:     Frequency of Communication with Friends and Family:     Frequency of Social Gatherings with Friends and Family:     Attends Orthodoxy Services:     Active Member of Clubs or Organizations:     Attends Club or Organization Meetings:     Marital Status:      Family History   Problem Relation Age of Onset    Hypertension Mother     Elevated Lipids Mother     Diabetes Paternal Grandmother     Diabetes Paternal Grandfather      Current Outpatient Medications   Medication Sig Dispense Refill    cetirizine (ZYRTEC) 10 mg tablet Take 1 Tablet by mouth nightly. 30 Tablet 0    metroNIDAZOLE (FLAGYL) 500 mg tablet Take 1 Tablet by mouth two (2) times a day for 7 days. 14 Tablet 0    ocrelizumab (OCREVUS IV) by IntraVENous route.  ibuprofen (MOTRIN) 200 mg tablet 1 tab by mouth daily just prior to injection as needed      ergocalciferol (ERGOCALCIFEROL) 50,000 unit capsule Take 1 Cap by mouth every seven (7) days. 15 Cap 1     Allergies   Allergen Reactions    Toradol [Ketorolac] Other (comments)     hallucinate       Objective:  Visit Vitals  /76 (BP 1 Location: Left upper arm, BP Patient Position: Semi fowlers, BP Cuff Size: Adult)   Pulse 74   Temp (!) 96.3 °F (35.7 °C) (Oral)   Resp 16   Ht 5' 6\" (1.676 m)   Wt 193 lb 4.8 oz (87.7 kg)   SpO2 100%   BMI 31.20 kg/m²     Physical Exam:   General appearance - alert, well appearing, and in no distress  Mental status - alert, oriented to person, place, and time  EYE-ROSARIO, EOMI, corneas normal, no foreign bodies  ENT-ENT exam normal, no neck nodes or sinus tenderness  Nose - normal and patent, no erythema, discharge or polyps  Mouth - mucous membranes moist, pharynx normal without lesions  Neck - supple, no significant adenopathy   Chest - clear to auscultation, no wheezes, rales or rhonchi, symmetric air entry   Heart - normal rate, regular rhythm, normal S1, S2, no murmurs, rubs, clicks or gallops   Abdomen - soft, nontender, nondistended, no masses or organomegaly  Lymph- no adenopathy palpable  Ext-peripheral pulses normal, no pedal edema, no clubbing or cyanosis  Skin-Warm and dry.  no hyperpigmentation, vitiligo, or suspicious lesions  Neuro -alert, oriented, normal speech, no focal findings or movement disorder noted  Musculoskeletal-normal C-spine, no tenderness, full ROM without pain  Feet-no nail deformities or callus formation with good pulses noted      Results for orders placed or performed in visit on 10/20/21   NUSWAB VAGINITIS   Result Value Ref Range    Source VAGINA      Atopobium vaginae SEE COMMENT (A) Score    BVAB 2 SEE COMMENT (A) Score    Megasphaera 1 SEE COMMENT (A) Score    C. albicans, CASEY Negative Negative      C. glabrata, CASEY Negative Negative      T. vaginalis, CASEY Negative Negative         Assessment/Plan:    ICD-10-CM ICD-9-CM    1. Encounter for routine adult medical examination  Z00.00 V70.0 CBC WITH AUTOMATED DIFF      METABOLIC PANEL, COMPREHENSIVE      AMB POC LIPID PROFILE      AMB POC HEMOGLOBIN A1C      AMB POC GLUCOSE BLOOD, BY GLUCOSE MONITORING DEVICE      AMB POC URINALYSIS DIP STICK AUTO W/O MICRO   2. Multiple sclerosis ([facility] [address]  G35 340    3. Iron deficiency anemia, unspecified iron deficiency anemia type  D50.9 280.9    4. Environmental and seasonal allergies  J30.89 477.8    5. Vitamin D deficiency  E55.9 268.9 metroNIDAZOLE (FLAGYL) 500 mg tablet   6. Vaginal discharge  N89.8 623.5 NUSWAB VAGINITIS      metroNIDAZOLE (FLAGYL) 500 mg tablet      NUSWAB VAGINITIS   7. Vaginal odor  N89.8 625.8 NUSWAB VAGINITIS      NUSWAB VAGINITIS   8. Body mass index (BMI) of 30.0 to 30.9 in adult  Z68.30 V85.30    9.  Screen for colon cancer  Z12.11 V76.51 OCCULT BLOOD IMMUNOASSAY,DIAGNOSTIC      OCCULT BLOOD IMMUNOASSAY,DIAGNOSTIC     Orders Placed This Encounter    CBC WITH AUTOMATED DIFF     Standing Status:   Future     Standing Expiration Date:   33/40/1487    METABOLIC PANEL, COMPREHENSIVE     Standing Status:   Future     Standing Expiration Date:   10/20/2022    NUSWAB VAGINITIS     Standing Status:   Future     Number of Occurrences:   1     Standing Expiration Date:   10/20/2022    OCCULT BLOOD IMMUNOASSAY,DIAGNOSTIC ([id number])     Standing Status:   Future     Number of Occurrences:   1     Standing Expiration Date:   2022     Order Specific Question:   QUEST SOURCE     Answer:   Stool [1161]    AMB POC LIPID PROFILE    AMB POC HEMOGLOBIN A1C    AMB POC GLUCOSE BLOOD, BY GLUCOSE MONITORING DEVICE    AMB POC URINALYSIS DIP STICK AUTO W/O MICRO    cetirizine (ZYRTEC) 10 mg tablet     Sig: Take 1 Tablet by mouth nightly. Dispense:  30 Tablet     Refill:  0    metroNIDAZOLE (FLAGYL) 500 mg tablet     Sig: Take 1 Tablet by mouth two (2) times a day for 7 days. Dispense:  14 Tablet     Refill:  0     Patient Instructions          Allergies: Care Instructions  Overview     Allergies occur when your body's defense system (immune system) overreacts to certain substances. The immune system treats a harmless substance as if it were a harmful germ or virus. Many things can make this happen. These include pollens, medicine, food, dust, animal dander, and mold. Allergies can be mild or severe. Mild allergies can be managed with home treatment. But medicine may be needed to prevent problems. Managing your allergies is an important part of staying healthy. Your doctor may suggest that you have allergy testing to help find out what is causing your allergies. Severe allergies can cause reactions that affect your whole body (anaphylactic reactions). Your doctor may prescribe a shot of epinephrine to carry with you in case you have a severe reaction. Learn how to give yourself the shot and keep it with you at all times. Make sure it is not . Follow-up care is a key part of your treatment and safety. Be sure to make and go to all appointments, and call your doctor if you are having problems. It's also a good idea to know your test results and keep a list of the medicines you take. How can you care for yourself at home?   · If you have been told by your doctor that dust or dust mites are causing your allergy, decrease the dust around your bed:  ? Wash sheets, pillowcases, and other bedding in hot water every week. ? Use dust-proof covers for pillows, duvets, and mattresses. Avoid plastic covers because they tear easily and do not \"breathe. \" Wash as instructed on the label. ? Do not use any blankets and pillows that you do not need. ? Use blankets that you can wash in your washing machine. ? Consider removing drapes and carpets, which attract and hold dust, from your bedroom. · If you are allergic to house dust and mites, do not use home humidifiers. Your doctor can suggest ways you can control dust and mites. · Look for signs of cockroaches. Cockroaches cause allergic reactions. Use cockroach baits to get rid of them. Then, clean your home well. Cockroaches like areas where grocery bags, newspapers, empty bottles, or cardboard boxes are stored. Do not keep these inside your home, and keep trash and food containers sealed. Seal off any spots where cockroaches might enter your home. · If you are allergic to mold, get rid of furniture, rugs, and drapes that smell musty. Check for mold in the bathroom. · If you are allergic to outdoor pollen or mold spores, use air-conditioning. Change or clean all filters every month. Keep windows closed. · If you are allergic to pollen, stay inside when pollen counts are high. Use a vacuum  with a HEPA filter or a double-thickness filter at least two times each week. · Stay inside when air pollution is bad. Avoid paint fumes, perfumes, and other strong odors. · Avoid conditions that make your allergies worse. Stay away from smoke. Do not smoke or let anyone else smoke in your house. Do not use fireplaces or wood-burning stoves. · If you are allergic to your pets, change the air filter in your furnace every month. Use high-efficiency filters. · If you are allergic to pet dander, keep pets outside or out of your bedroom.  Old carpet and cloth furniture can hold a lot of animal dander. You may need to replace them. When should you call for help? Give an epinephrine shot if:    · You think you are having a severe allergic reaction.     · You have symptoms in more than one body area, such as mild nausea and an itchy mouth. After giving an epinephrine shot call 911, even if you feel better. Call 911 if:    · You have symptoms of a severe allergic reaction. These may include:  ? Sudden raised, red areas (hives) all over your body. ? Swelling of the throat, mouth, lips, or tongue. ? Trouble breathing. ? Passing out (losing consciousness). Or you may feel very lightheaded or suddenly feel weak, confused, or restless.     · You have been given an epinephrine shot, even if you feel better. Call your doctor now or seek immediate medical care if:    · You have symptoms of an allergic reaction, such as:  ? A rash or hives (raised, red areas on the skin). ? Itching. ? Swelling. ? Belly pain, nausea, or vomiting. Watch closely for changes in your health, and be sure to contact your doctor if:    · You do not get better as expected. Where can you learn more? Go to http://www.fontanez.com/  Enter W171 in the search box to learn more about \"Allergies: Care Instructions. \"  Current as of: February 10, 2021               Content Version: 13.0  © 7292-7395 [employer]. Care instructions adapted under license by [employer] (which disclaims liability or warranty for this information). If you have questions about a medical condition or this instruction, always ask your healthcare professional. [place] [address] any warranty or liability for your use of this information. Colon Cancer Screening: Care Instructions  Overview     Colorectal cancer occurs in the colon or rectum. That's the lower part of your digestive system. It often starts in small growths called polyps in the colon or rectum.  Polyps are usually found with screening tests. Depending on the type of test, any polyps found may be removed during the tests. Colorectal cancer usually does not cause symptoms at first. But regular tests can help find it early, before it spreads and becomes harder to treat. Your risk for colorectal cancer gets higher as you get older. Experts recommend starting screening at age 39 for people who are at average risk. Talk with your doctor about your risk and when to start and stop screening. You may have one of several tests. Follow-up care is a key part of your treatment and safety. Be sure to make and go to all appointments, and call your doctor if you are having problems. It's also a good idea to know your test results and keep a list of the medicines you take. What are the main screening tests for colon cancer? The screening tests are:  Stool tests. These include the guaiac fecal occult blood test (gFOBT), the fecal immunochemical test (FIT), and the combined fecal immunochemical test and stool DNA test (FIT-DNA). These tests check stool samples for signs of cancer. If your test is positive, you will need to have a colonoscopy. Sigmoidoscopy. This test lets your doctor look at the lining of your rectum and the lowest part of your colon. Your doctor uses a lighted tube called a sigmoidoscope. This test can't find cancers or polyps in the upper part of your colon. In some cases, polyps that are found can be removed. But if your doctor finds polyps, you will need to have a colonoscopy to check the upper part of your colon. Colonoscopy. This test lets your doctor look at the lining of your rectum and your entire colon. The doctor uses a thin, flexible tool called a colonoscope. It can also be used to remove polyps or get a tissue sample (biopsy). A less common test is CT colonography (CTC). It's also called virtual colonoscopy. Who should be screened for colorectal cancer?   Your risk for colorectal cancer gets higher as you get older. Experts recommend starting screening at age 39 for people who are at average risk. Talk with your doctor about your risk and when to start and stop screening. How often you need screening depends on the type of test you get:  Stool tests. Every year for FIT or gFOBT. Every 1 to 3 years for sDNA, also called FIT-DNA. Tests that look inside the colon. Every 5 years for sigmoidoscopy. (If you do the FIT test every year, you can get this test every 10 years.)   Every 5 years for CT colonography (virtual colonoscopy). Every 10 years for colonoscopy. Experts agree that people at higher risk may need to be tested sooner and more often. This includes people who have a strong family history of colon cancer. Talk to your doctor about which test is best for you and when to be tested. When should you call for help? Watch closely for changes in your health, and be sure to contact your doctor if:    · You have any changes in your bowel habits.     · You have any problems. Where can you learn more? Go to http://www.gray.com/  Enter M541 in the search box to learn more about \"Colon Cancer Screening: Care Instructions. \"  Current as of: December 17, 2020               Content Version: 13.0  © 2006-2021 [employer]. Care instructions adapted under license by "[employer]" (which disclaims liability or warranty for this information). If you have questions about a medical condition or this instruction, always ask your healthcare professional. [place] [address] any warranty or liability for your use of this information. Multiple Sclerosis (MS): Care Instructions  Your Care Instructions  Multiple sclerosis, also called MS, is a disease that can affect the brain, spinal cord, and nerves to the eyes. MS can cause problems with muscle control and strength, vision, balance, feeling, and thinking.  Whatever your symptoms are, taking medicine correctly and following your doctor's advice for home care can help you maintain your quality of life. Follow-up care is a key part of your treatment and safety. Be sure to make and go to all appointments, and call your doctor if you are having problems. It's also a good idea to know your test results and keep a list of the medicines you take. How can you care for yourself at home? General care  · Take your medicines exactly as prescribed. Call your doctor if you think you are having a problem with your medicine. · Use a cane, walker, or scooter if your doctor suggests it. · Keep doing your normal activities as much as you can. · If you have problems urinating, press or tap your bladder area to help start urine flow. If you have trouble controlling your urine, plan your fluid intake and activities so that a toilet will be available when you need it. · Spend time with family and friends. Join a support group for people with MS if you want extra help. · Depression is common with this condition. Tell your doctor if you have trouble sleeping, are eating too much or are not hungry, or feel sad or tearful all the time. Depression can be treated with medicine and counseling. Diet and exercise  · Eat a balanced diet. · If you have problems swallowing, change how and what you eat:  ? Try thick drinks, such as milk shakes. They are easier to swallow than other fluids. ? Do not eat foods that crumble easily. These can cause choking. ? Use a  to prepare food. Soft foods need less chewing. ? Eat small meals often so that you do not get tired from eating larger meals. · Get exercise on most days. Work with your doctor to set up a program of walking, swimming, or other exercise that you are able to do. A physical therapist can teach you exercises if you cannot walk but can move your limbs and trunk. Or you can do exercises to help with coordination and balance.  You can help improve muscle stiffness by doing exercises while lying in certain positions. When should you call for help? Call your doctor now or seek immediate medical care if:    · You have a change in symptoms.     · You fall or have another injury.     · You have symptoms of a urinary infection. For example:  ? You have blood or pus in your urine. ? You have pain in your back just below your rib cage. This is called flank pain. ? You have a fever, chills, or body aches. ? It hurts to urinate. ? You have groin or belly pain. Watch closely for changes in your health, and be sure to contact your doctor if:    · You want more information about MS or medicines.     · You have questions about alternative treatments. Do not use any other treatments without talking to your doctor first.   Where can you learn more? Go to http://www.gray.com/  Enter I280 in the search box to learn more about \"Multiple Sclerosis (MS): Care Instructions. \"  Current as of: April 8, 2021               Content Version: 13.0  © 1793-2665 [employer]. Care instructions adapted under license by [employer] (which disclaims liability or warranty for this information). If you have questions about a medical condition or this instruction, always ask your healthcare professional. [address] any warranty or liability for your use of this information. Learning About Vitamin D  Why is it important to get enough vitamin D? Your body needs vitamin D to absorb calcium. Calcium keeps your bones and muscles, including your heart, healthy and strong. If your muscles don't get enough calcium, they can cramp, hurt, or feel weak. You may have long-term (chronic) muscle aches and pains. If you don't get enough vitamin D throughout life, you have an increased chance of having thin and brittle bones (osteoporosis) in your later years.  Children who don't get enough vitamin D may not grow as much as others their age. They also have a chance of getting a rare disease called rickets. It causes weak bones. Vitamin D and calcium are added to many foods. And your body uses sunshine to make its own vitamin D. How much vitamin D do you need? The recommended daily allowance (RDA) for vitamin D is 600 IU (international units) every day for people ages 3 through 79. Adults 71 and older need 800 IU every day. Blood tests for vitamin D can check your vitamin D level. But there is no standard normal range used by all laboratories. You're likely getting enough vitamin D if your levels are in the range of 20 to 50 ng/mL. How can you get more vitamin D? Foods that contain vitamin D include:  · [place], tuna, and mackerel. These are some of the best foods to eat when you need to get more vitamin D.  · Cheese, egg yolks, and beef liver. These foods have vitamin D in small amounts. · Milk, soy drinks, orange juice, yogurt, margarine, and some kinds of cereal have vitamin D added to them. Some people don't make vitamin D as well as others. They may have to take extra care in getting enough vitamin D. Things that reduce how much vitamin D your body makes include:  · Dark skin, such as many  Americans have. · Age, especially if you are older than 72. · Digestive problems, such as Crohn's or celiac disease. · Liver and kidney disease. Some people who do not get enough vitamin D may need supplements. Are there any risks from taking vitamin D?  · Too much vitamin D:  ? Can damage your kidneys. ? Can cause nausea and vomiting, constipation, and weakness. ? Raises the amount of calcium in your blood. If this happens, you can get confused or have an irregular heart rhythm. · Vitamin D may interact with other medicines. Tell your doctor about all of the medicines you take, including over-the-counter drugs, herbs, and pills. Tell your doctor about all of your current medical problems. Where can you learn more?   Go to http://www.gray.com/  Enter V530 in the search box to learn more about \"Learning About Vitamin D.\"  Current as of: December 17, 2020               Content Version: 13.0  © 2006-2021 Healthwise, [facility]. Care instructions adapted under license by [employer] (which disclaims liability or warranty for this information). If you have questions about a medical condition or this instruction, always ask your healthcare professional. [place] [address] any warranty or liability for your use of this information. Follow-up and Dispositions    · Return in about 2 weeks (around 11/3/2021), or if symptoms worsen or fail to improve, for Discuss Test Results and Care Gaps. I have reviewed with the patient details of the assessment and plan and all questions were answered. Relevent patient education was performed. The most recent lab findings were reviewed with the patient. An After Visit Summary was printed and given to the patient.

## 2021-10-26 LAB — HEMOCCULT STL QL IA: NEGATIVE

## 2021-10-26 NOTE — PROGRESS NOTES
Patient was given metronidazole for treatment of BV on 10-20-21, results confirmed bacterial vaginitis.

## 2021-12-31 ENCOUNTER — OFFICE VISIT (OUTPATIENT)
Dept: URGENT CARE | Age: 46
End: 2021-12-31
Payer: OTHER GOVERNMENT

## 2021-12-31 VITALS — OXYGEN SATURATION: 100 % | RESPIRATION RATE: 16 BRPM | HEART RATE: 64 BPM | TEMPERATURE: 97.6 F

## 2021-12-31 DIAGNOSIS — R05.9 COUGH: ICD-10-CM

## 2021-12-31 DIAGNOSIS — U07.1 COVID-19: Primary | ICD-10-CM

## 2021-12-31 DIAGNOSIS — Z20.822 EXPOSURE TO COVID-19 VIRUS: ICD-10-CM

## 2021-12-31 LAB — SARS-COV-2 POC: POSITIVE

## 2021-12-31 PROCEDURE — 87426 SARSCOV CORONAVIRUS AG IA: CPT | Performed by: FAMILY MEDICINE

## 2021-12-31 PROCEDURE — 99212 OFFICE O/P EST SF 10 MIN: CPT | Performed by: FAMILY MEDICINE

## 2021-12-31 NOTE — PROGRESS NOTES
This patient was seen at [address] [place] [address] [facility]gent Care while in their vehicle due to COVID-19 pandemic with PPE and focused examination in order to decrease community viral transmission. The patient/guardian gave verbal consent to treat. Rocio Parmar is a 51 y.o. female who presents for COVID-19 testing. Was exposed to COVID-19. Denies fever, SOB, N/V/D. Patient reports having body aches, headache, and runny nose since 12/26/2021. The history is provided by the patient. Past Medical History:   Diagnosis Date    History of shingles     MS (multiple sclerosis) ([facility] [address]         Past Surgical History:   Procedure Laterality Date    HX HYSTERECTOMY  02/2016    left ovaries    HX ORTHOPAEDIC  2001, 2003    bilat feet, bunions removed, bones removed from pinky         Family History   Problem Relation Age of Onset    Hypertension Mother     Elevated Lipids Mother     Diabetes Paternal Grandmother     Diabetes Paternal Grandfather         Social History     Socioeconomic History    Marital status:      Spouse name: Not on file    Number of children: Not on file    Years of education: Not on file    Highest education level: Not on file   Occupational History    Not on file   Tobacco Use    Smoking status: Never Smoker    Smokeless tobacco: Never Used   Vaping Use    Vaping Use: Never used   Substance and Sexual Activity    Alcohol use: No    Drug use: No    Sexual activity: Not Currently   Other Topics Concern    Not on file   Social History Narrative    Not on file     Social Determinants of Health     Financial Resource Strain:     Difficulty of Paying Living Expenses: Not on file   Food Insecurity:     Worried About [address] in the Last Year: Not on file    [address] in the Last Year: Not on file   Transportation Needs:     Lack of Transportation (Medical): Not on file    Lack of Transportation (Non-Medical):  Not on file   Physical Activity:     Days of Exercise per Week: Not on file    Minutes of Exercise per Session: Not on file   Stress:     Feeling of Stress : Not on file   Social Connections:     Frequency of Communication with Friends and Family: Not on file    Frequency of Social Gatherings with Friends and Family: Not on file    Attends Evangelical Services: Not on file    Active Member of [address] [place] [address] [employer] or Organizations: Not on file    Attends Club or Organization Meetings: Not on file    Marital Status: Not on file   Intimate Partner Violence:     Fear of Current or Ex-Partner: Not on file    Emotionally Abused: Not on file    Physically Abused: Not on file    Sexually Abused: Not on file   Housing Stability:     Unable to Pay for Housing in the Last Year: Not on file    Number of [place] in the Last Year: Not on file    Unstable Housing in the Last Year: Not on file                ALLERGIES: Toradol [ketorolac]    Review of Systems   Constitutional: Negative for activity change, appetite change and fever. Respiratory: Negative for cough and shortness of breath. Vitals:    12/31/21 1014   Pulse: 64   Resp: 16   Temp: 97.6 °F (36.4 °C)   SpO2: 100%       Physical Exam  Vitals and nursing note reviewed. Constitutional:       General: She is not in acute distress. Appearance: She is well-developed. She is not diaphoretic. Pulmonary:      Effort: Pulmonary effort is normal. No respiratory distress. Breath sounds: Normal breath sounds. No stridor. No wheezing, rhonchi or rales. Neurological:      Mental Status: She is alert. Psychiatric:         Behavior: Behavior normal.         Thought Content: Thought content normal.         Judgment: Judgment normal.         MDM    ICD-10-CM ICD-9-CM   1. COVID-19  U07.1 079.89   2. Exposure to COVID-19 virus  Z20.822 V01.79   3.  Cough  R05.9 786.2       Orders Placed This Encounter    AMB POC SARS-COV-2 ANTIGEN     Order Specific Question:   Is this test for diagnosis or screening? Answer:   Screening     Order Specific Question:   Symptomatic for COVID-19 as defined by CDC? Answer:   No     Order Specific Question:   Hospitalized for COVID-19? Answer:   No     Order Specific Question:   Admitted to ICU for COVID-19? Answer:   No     Order Specific Question:   Employed in healthcare setting? Answer:   Unknown     Order Specific Question:   Resident in a congregate (group) care setting? Answer:   Unknown     Order Specific Question:   Pregnant? Answer:   No     Order Specific Question:   Previously tested for COVID-19? Answer:   Unknown      Quarantine x 5-10 days from sx onset  Deep breathing exercises, ambulation  Tylenol prn  Increase fluids with electrolytes if decreased PO intake    If signs and symptoms become worse the pt is to go to the ER.      Results for orders placed or performed in visit on 12/31/21   AMB POC SARS-COV-2   Result Value Ref Range    SARS-COV-2 POC Positive (A) Negative       Procedures

## 2022-03-19 PROBLEM — G35 MULTIPLE SCLEROSIS (HCC): Status: ACTIVE | Noted: 2017-03-14

## 2022-03-19 PROBLEM — E55.9 VITAMIN D DEFICIENCY: Status: ACTIVE | Noted: 2019-10-14

## 2022-05-09 ENCOUNTER — OFFICE VISIT (OUTPATIENT)
Dept: INTERNAL MEDICINE CLINIC | Age: 47
End: 2022-05-09
Payer: OTHER GOVERNMENT

## 2022-05-09 VITALS
BODY MASS INDEX: 31.82 KG/M2 | WEIGHT: 198 LBS | HEIGHT: 66 IN | TEMPERATURE: 98 F | DIASTOLIC BLOOD PRESSURE: 75 MMHG | HEART RATE: 71 BPM | OXYGEN SATURATION: 98 % | SYSTOLIC BLOOD PRESSURE: 111 MMHG | RESPIRATION RATE: 16 BRPM

## 2022-05-09 DIAGNOSIS — G35 MULTIPLE SCLEROSIS (HCC): ICD-10-CM

## 2022-05-09 DIAGNOSIS — E55.9 VITAMIN D DEFICIENCY: ICD-10-CM

## 2022-05-09 DIAGNOSIS — M25.562 ACUTE PAIN OF LEFT KNEE: ICD-10-CM

## 2022-05-09 DIAGNOSIS — Z00.00 ENCOUNTER FOR ROUTINE ADULT MEDICAL EXAMINATION: Primary | ICD-10-CM

## 2022-05-09 DIAGNOSIS — D50.9 IRON DEFICIENCY ANEMIA, UNSPECIFIED IRON DEFICIENCY ANEMIA TYPE: ICD-10-CM

## 2022-05-09 PROCEDURE — 99214 OFFICE O/P EST MOD 30 MIN: CPT | Performed by: NURSE PRACTITIONER

## 2022-05-09 RX ORDER — IBUPROFEN 400 MG/1
400 TABLET ORAL
Qty: 30 TABLET | Refills: 0 | Status: SHIPPED | OUTPATIENT
Start: 2022-05-09

## 2022-05-09 NOTE — PROGRESS NOTES
1. Have you been to the ER, urgent care clinic since your last visit? Hospitalized since your last visit?no    2. Have you seen or consulted any other health care providers outside of the [address] [place] [address] since your last visit? Include any pap smears or colon screening.  No    Chief Complaint   Patient presents with    Multiple Sclerosis    Knee Pain     right     3 most recent PHQ Screens 10/20/2021   Little interest or pleasure in doing things Not at all   Feeling down, depressed, irritable, or hopeless Not at all   Total Score PHQ 2 0   Trouble falling or staying asleep, or sleeping too much -   Feeling tired or having little energy -   Poor appetite, weight loss, or overeating -   Feeling bad about yourself - or that you are a failure or have let yourself or your family down -   Trouble concentrating on things such as school, work, reading, or watching TV -   Moving or speaking so slowly that other people could have noticed; or the opposite being so fidgety that others notice -   Thoughts of being better off dead, or hurting yourself in some way -   PHQ 9 Score -   How difficult have these problems made it for you to do your work, take care of your home and get along with others -

## 2022-05-09 NOTE — PATIENT INSTRUCTIONS
Iron Deficiency Anemia: Care Instructions  Your Care Instructions     Anemia means that you don't have enough red blood cells. Red blood cells carry oxygen around your body. When you have anemia, it can make you pale, weak, and tired. Many things can cause anemia. The most common cause is loss of blood. This can happen if you have heavy menstrual periods. It can also happen if you have bleeding in your stomach or bowel. You can also get anemia if you don't have enough iron in your diet or if it's hard for your body to absorb iron. In some cases, pregnancy causes anemia. That's because a pregnant woman needs more iron. Your doctor may do more tests to find the cause of your anemia. If a disease or other health problem is causing it, your doctor will treat that problem. It's important to follow up with your doctor to make sure that your iron level returns to normal.  Follow-up care is a key part of your treatment and safety. Be sure to make and go to all appointments, and call your doctor if you are having problems. It's also a good idea to know your test results and keep a list of the medicines you take. How can you care for yourself at home? · If your doctor recommended iron pills, take them as directed. ? Try to take the pills on an empty stomach. You can do this about 1 hour before or 2 hours after meals. But you may need to take iron with food to avoid an upset stomach. ? Do not take antacids or drink milk or anything with caffeine within 2 hours of when you take your iron. They can keep your body from absorbing the iron well. ? Vitamin C helps your body absorb iron. You may want to take iron pills with a glass of orange juice or some other food high in vitamin C.  ? Iron pills may cause stomach problems. These include heartburn, nausea, diarrhea, constipation, and cramps. It can help to drink plenty of fluids and include fruits, vegetables, and fiber in your diet.   ? It's normal for iron pills to make your stool a greenish or grayish black. But internal bleeding can also cause dark stool. So it's important to tell your doctor about any color changes. ? Call your doctor if you think you are having a problem with your iron pills. Even after you start to feel better, it will take several months for your body to build up its supply of iron. ? If you miss a pill, don't take a double dose. ? Keep iron pills out of the reach of small children. Too much iron can be very dangerous. · Eat foods with a lot of iron. These include red meat, shellfish, poultry, and eggs. They also include beans, raisins, whole-grain bread, and leafy green vegetables. · Steam your vegetables. This is the best way to prepare them if you want to get as much iron as possible. · Be safe with medicines. Do not take nonsteroidal anti-inflammatory pain relievers unless your doctor tells you to. These include aspirin, naproxen (Aleve), and ibuprofen (Advil, Motrin). · Liquid iron can stain your teeth. But you can mix it with water or juice and drink it with a straw. Then it won't get on your teeth. When should you call for help? Call 911 anytime you think you may need emergency care. For example, call if:    · You passed out (lost consciousness). Call your doctor now or seek immediate medical care if:    · You are short of breath.     · You are dizzy or light-headed, or you feel like you may faint.     · You have new or worse bleeding. Watch closely for changes in your health, and be sure to contact your doctor if:    · You feel weaker or more tired than usual.     · You do not get better as expected. Where can you learn more? Go to http://www.gray.com/  Enter Z825 in the search box to learn more about \"Iron Deficiency Anemia: Care Instructions. \"  Current as of: November 29, 2021               Content Version: 13.2  © 2942-5240 [employer].    Care instructions adapted under license by [employer] (which disclaims liability or warranty for this information). If you have questions about a medical condition or this instruction, always ask your healthcare professional. [address] any warranty or liability for your use of this information. Joint Pain: Care Instructions  Your Care Instructions     Many people have small aches and pains from overuse or injury to muscles and joints. Joint injuries often happen during sports or recreation, work tasks, or projects around the home. An overuse injury can happen when you put too much stress on a joint or when you do an activity that stresses the joint over and over, such as using the computer or rowing a boat. You can take action at home to help your muscles and joints get better. You should feel better in 1 to 2 weeks, but it can take 3 months or more to heal completely. Follow-up care is a key part of your treatment and safety. Be sure to make and go to all appointments, and call your doctor if you are having problems. It's also a good idea to know your test results and keep a list of the medicines you take. How can you care for yourself at home? · Do not put weight on the injured joint for at least a day or two. · For the first day or two after an injury, do not take hot showers or baths, and do not use hot packs. The heat could make swelling worse. · Put ice or a cold pack on the sore joint for 10 to 20 minutes at a time. Try to do this every 1 to 2 hours for the next 3 days (when you are awake) or until the swelling goes down. Put a thin cloth between the ice and your skin. · Wrap the injury in an elastic bandage. Do not wrap it too tightly because this can cause more swelling. · Prop up the sore joint on a pillow when you ice it or anytime you sit or lie down during the next 3 days. Try to keep it above the level of your heart. This will help reduce swelling.   · Take an over-the-counter pain medicine, such as acetaminophen (Tylenol), ibuprofen (Advil, Motrin), or naproxen (Aleve). Read and follow all instructions on the label. · After 1 or 2 days of rest, begin moving the joint gently. While the joint is still healing, you can begin to exercise using activities that do not strain or hurt the painful joint. When should you call for help? Call your doctor now or seek immediate medical care if:    · You have signs of infection, such as:  ? Increased pain, swelling, warmth, and redness. ? Red streaks leading from the joint. ? A fever. Watch closely for changes in your health, and be sure to contact your doctor if:    · Your movement or symptoms are not getting better after 1 to 2 weeks of home treatment. Where can you learn more? Go to http://www.gray.com/  Enter P205 in the search box to learn more about \"Joint Pain: Care Instructions. \"  Current as of: July 1, 2021               Content Version: 13.2  © 2006-2022 [employer]. Care instructions adapted under license by [employer] (which disclaims liability or warranty for this information). If you have questions about a medical condition or this instruction, always ask your healthcare professional. [place] [address] any warranty or liability for your use of this information. Multiple Sclerosis (MS): Care Instructions  Your Care Instructions  Multiple sclerosis, also called MS, is a disease that can affect the brain, spinal cord, and nerves to the eyes. MS can cause problems with muscle control and strength, vision, balance, feeling, and thinking. Whatever your symptoms are, taking medicine correctly and following your doctor's advice for home care can help you maintain your quality of life. Follow-up care is a key part of your treatment and safety. Be sure to make and go to all appointments, and call your doctor if you are having problems.  It's also a good idea to know your test results and keep a list of the medicines you take. How can you care for yourself at home? General care  · Take your medicines exactly as prescribed. Call your doctor if you think you are having a problem with your medicine. · Use a cane, walker, or scooter if your doctor suggests it. · Keep doing your normal activities as much as you can. · If you have problems urinating, press or tap your bladder area to help start urine flow. If you have trouble controlling your urine, plan your fluid intake and activities so that a toilet will be available when you need it. · Spend time with family and friends. Join a support group for people with MS if you want extra help. · Depression is common with this condition. Tell your doctor if you have trouble sleeping, are eating too much or are not hungry, or feel sad or tearful all the time. Depression can be treated with medicine and counseling. Diet and exercise  · Eat a balanced diet. · If you have problems swallowing, change how and what you eat:  ? Try thick drinks, such as milk shakes. They are easier to swallow than other fluids. ? Do not eat foods that crumble easily. These can cause choking. ? Use a  to prepare food. Soft foods need less chewing. ? Eat small meals often so that you do not get tired from eating larger meals. · Get exercise on most days. Work with your doctor to set up a program of walking, swimming, or other exercise that you are able to do. A physical therapist can teach you exercises if you cannot walk but can move your limbs and trunk. Or you can do exercises to help with coordination and balance. You can help improve muscle stiffness by doing exercises while lying in certain positions. When should you call for help? Call your doctor now or seek immediate medical care if:    · You have a change in symptoms.     · You fall or have another injury.     · You have symptoms of a urinary infection. For example:  ?  You have blood or pus in your urine.  ? You have pain in your back just below your rib cage. This is called flank pain. ? You have a fever, chills, or body aches. ? It hurts to urinate. ? You have groin or belly pain. Watch closely for changes in your health, and be sure to contact your doctor if:    · You want more information about MS or medicines.     · You have questions about alternative treatments. Do not use any other treatments without talking to your doctor first.   Where can you learn more? Go to http://www.gray.com/  Enter J524 in the search box to learn more about \"Multiple Sclerosis (MS): Care Instructions. \"  Current as of: December 13, 2021               Content Version: 13.2  © 2006-2022 [employer]. Care instructions adapted under license by [employer] (which disclaims liability or warranty for this information). If you have questions about a medical condition or this instruction, always ask your healthcare professional. [place] [address] any warranty or liability for your use of this information.

## 2022-05-10 NOTE — PROGRESS NOTES
Subjective:  Astrid Santiago is a 51 y.o. female and presents with Multiple Sclerosis, Anemia, Allergies and Vitamin D deficiency for a Routine Medical Exam and Labs. She is further presenting with additional complaints of having Knee Pain (right)    Multiple Sclerosis Review:  Patient is presenting today for evaluation and treatment of multiple sclerosis, not currently in any distress. Patient denies visual symptoms including persistent visual blurring , subdued color perception and diplopia, sensory symptoms, motor symptoms, cerebellar symptoms, cranial nerve symptoms, urinary tract symptoms, cognitive symptoms, sexual symptoms, affective symptoms and constitutional symptoms.     Anemia  Patient presents for  evaluation of anemia. Anemia was found by routine CBC. It has been present for several years, associated signs & symptoms: fatigue     VitaminD Deficiency Review  Patient presents with Vitamin D deficiency. Patient denies symptoms of bone pain, muscle weakness, Fatigue or other neurological symptoms. Labs were ordered to assess the need for continued Vitamin D replacement.       Review of Systems  Constitutional: negative for fevers, chills, anorexia and weight loss  Eyes:               negative for visual disturbance and irritation  ENT:                negative for tinnitus,sore throat,nasal congestion,ear pains. hoarseness  Respiratory:     negative for cough, hemoptysis, dyspnea,wheezing  CV:                  negative for chest pain, palpitations, lower extremity edema  GI:                   negative for nausea, vomiting, diarrhea, abdominal pain,melena  Endo:               negative for polyuria,polydipsia,polyphagia,heat intolerance  Genitourinary: negative for frequency, dysuria and hematuria  Integument:     negative for rash and pruritus  Hematologic:   negative for easy bruising and gum/nose bleeding  Musculoskel:   positive for myalgias, arthralgias,joint pain (left knee)  Neurological: negative for headaches, dizziness, vertigo, memory problems and gait   Behavl/Psych: negative for feelings of anxiety, depression, mood changes    Past Medical History:   Diagnosis Date    History of shingles     MS (multiple sclerosis) ([facility] [address]      Past Surgical History:   Procedure Laterality Date    HX HYSTERECTOMY  02/2016    left ovaries    HX ORTHOPAEDIC  2001, 2003    bilat feet, bunions removed, bones removed from pinky     Social History     Socioeconomic History    Marital status:    Tobacco Use    Smoking status: Never Smoker    Smokeless tobacco: Never Used   Vaping Use    Vaping Use: Never used   Substance and Sexual Activity    Alcohol use: No    Drug use: No    Sexual activity: Not Currently     Family History   Problem Relation Age of Onset    Hypertension Mother     Elevated Lipids Mother     Diabetes Paternal Grandmother     Diabetes Paternal Grandfather      Current Outpatient Medications   Medication Sig Dispense Refill    ibuprofen (MOTRIN) 400 mg tablet Take 1 Tablet by mouth every eight (8) hours as needed for Pain. Indications: pain 30 Tablet 0    cetirizine (ZYRTEC) 10 mg tablet Take 1 Tablet by mouth nightly. 30 Tablet 0    ocrelizumab (OCREVUS IV) by IntraVENous route.  ergocalciferol (ERGOCALCIFEROL) 50,000 unit capsule Take 1 Cap by mouth every seven (7) days. 15 Cap 1     Allergies   Allergen Reactions    Toradol [Ketorolac] Other (comments)     Only Toradol, uses ibuprofen regularly with no complications or side effects.        Objective:  Visit Vitals  /75   Pulse 71   Temp 98 °F (36.7 °C) (Oral)   Resp 16   Ht 5' 6\" (1.676 m)   Wt 198 lb (89.8 kg)   SpO2 98%   BMI 31.96 kg/m²     Physical Exam:   General appearance - alert, well appearing, and in no distress  Mental status - alert, oriented to person, place, and time  EYE-ROSARIO, EOMI, corneas normal, no foreign bodies  ENT-ENT exam normal, no neck nodes or sinus tenderness  Nose - normal and patent, no erythema, discharge or polyps  Mouth - mucous membranes moist, pharynx normal without lesions  Neck - supple, no significant adenopathy   Chest - clear to auscultation, no wheezes, rales or rhonchi, symmetric air entry   Heart - normal rate, regular rhythm, normal S1, S2, no murmurs, rubs, clicks or gallops   Abdomen - soft, nontender, nondistended, no masses or organomegaly  Lymph- no adenopathy palpable  Ext-peripheral pulses normal, no pedal edema, no clubbing or cyanosis  Skin-Warm and dry. no hyperpigmentation, vitiligo, or suspicious lesions  Neuro -alert, oriented, normal speech, no focal findings or movement disorder noted  Musculoskeletal-tenderness, reduced ROM with pain. Feet-no nail deformities or callus formation with good pulses noted. Results for orders placed or performed in visit on 12/31/21   AMB POC SARS-COV-2   Result Value Ref Range    SARS-COV-2 POC Positive (A) Negative       Assessment/Plan:    ICD-10-CM ICD-9-CM    1. Encounter for routine adult medical examination  Z00.00 V70.0 CBC WITH AUTOMATED DIFF      METABOLIC PANEL, COMPREHENSIVE      LIPID PANEL      HEMOGLOBIN A1C WITH EAG      AMB POC URINALYSIS DIP STICK MANUAL W/O MICRO   2. [facility]  G35 340    3. Iron deficiency anemia, unspecified iron deficiency anemia type  D50.9 280.9 CBC WITH AUTOMATED DIFF   4. Vitamin D deficiency  E55.9 268.9 VITAMIN D, 25 HYDROXY   5. Acute pain of left knee  M25.562 719.46 XR KNEE LT MAX 2 VWS      ibuprofen (MOTRIN) 400 mg tablet     Orders Placed This Encounter    XR KNEE LT MAX 2 VWS     Standing Status:   Future     Standing Expiration Date:   11/9/2022     Order Specific Question:   Is Patient Pregnant?      Answer:   No    CBC WITH AUTOMATED DIFF     Standing Status:   Future     Standing Expiration Date:   2/3/7523    METABOLIC PANEL, COMPREHENSIVE     Standing Status:   Future     Standing Expiration Date:   5/9/2023    LIPID PANEL     Standing Status:   Future Standing Expiration Date:   11/9/2022    HEMOGLOBIN A1C WITH EAG     Standing Status:   Future     Standing Expiration Date:   5/9/2023    VITAMIN D, 25 HYDROXY     Standing Status:   Future     Standing Expiration Date:   5/9/2023    AMB POC URINALYSIS DIP STICK MANUAL W/O MICRO    ibuprofen (MOTRIN) 400 mg tablet     Sig: Take 1 Tablet by mouth every eight (8) hours as needed for Pain. Indications: pain     Dispense:  30 Tablet     Refill:  0     Patient Instructions          Iron Deficiency Anemia: Care Instructions  Your Care Instructions     Anemia means that you don't have enough red blood cells. Red blood cells carry oxygen around your body. When you have anemia, it can make you pale, weak, and tired. Many things can cause anemia. The most common cause is loss of blood. This can happen if you have heavy menstrual periods. It can also happen if you have bleeding in your stomach or bowel. You can also get anemia if you don't have enough iron in your diet or if it's hard for your body to absorb iron. In some cases, pregnancy causes anemia. That's because a pregnant woman needs more iron. Your doctor may do more tests to find the cause of your anemia. If a disease or other health problem is causing it, your doctor will treat that problem. It's important to follow up with your doctor to make sure that your iron level returns to normal.  Follow-up care is a key part of your treatment and safety. Be sure to make and go to all appointments, and call your doctor if you are having problems. It's also a good idea to know your test results and keep a list of the medicines you take. How can you care for yourself at home? · If your doctor recommended iron pills, take them as directed. ? Try to take the pills on an empty stomach. You can do this about 1 hour before or 2 hours after meals. But you may need to take iron with food to avoid an upset stomach.   ? Do not take antacids or drink milk or anything with caffeine within 2 hours of when you take your iron. They can keep your body from absorbing the iron well. ? Vitamin C helps your body absorb iron. You may want to take iron pills with a glass of orange juice or some other food high in vitamin C.  ? Iron pills may cause stomach problems. These include heartburn, nausea, diarrhea, constipation, and cramps. It can help to drink plenty of fluids and include fruits, vegetables, and fiber in your diet. ? It's normal for iron pills to make your stool a greenish or grayish black. But internal bleeding can also cause dark stool. So it's important to tell your doctor about any color changes. ? Call your doctor if you think you are having a problem with your iron pills. Even after you start to feel better, it will take several months for your body to build up its supply of iron. ? If you miss a pill, don't take a double dose. ? Keep iron pills out of the reach of small children. Too much iron can be very dangerous. · Eat foods with a lot of iron. These include red meat, shellfish, poultry, and eggs. They also include beans, raisins, whole-grain bread, and leafy green vegetables. · Steam your vegetables. This is the best way to prepare them if you want to get as much iron as possible. · Be safe with medicines. Do not take nonsteroidal anti-inflammatory pain relievers unless your doctor tells you to. These include aspirin, naproxen (Aleve), and ibuprofen (Advil, Motrin). · Liquid iron can stain your teeth. But you can mix it with water or juice and drink it with a straw. Then it won't get on your teeth. When should you call for help? Call 911 anytime you think you may need emergency care. For example, call if:    · You passed out (lost consciousness). Call your doctor now or seek immediate medical care if:    · You are short of breath.     · You are dizzy or light-headed, or you feel like you may faint.     · You have new or worse bleeding.    Watch closely for changes in your health, and be sure to contact your doctor if:    · You feel weaker or more tired than usual.     · You do not get better as expected. Where can you learn more? Go to http://www.gray.com/  Enter Z825 in the search box to learn more about \"Iron Deficiency Anemia: Care Instructions. \"  Current as of: November 29, 2021               Content Version: 13.2  © 2006-2022 [employer] Care instructions adapted under license by [employer] (which disclaims liability or warranty for this information). If you have questions about a medical condition or this instruction, always ask your healthcare professional. [place] [address] any warranty or liability for your use of this information. Joint Pain: Care Instructions  Your Care Instructions     Many people have small aches and pains from overuse or injury to muscles and joints. Joint injuries often happen during sports or recreation, work tasks, or projects around the home. An overuse injury can happen when you put too much stress on a joint or when you do an activity that stresses the joint over and over, such as using the computer or rowing a boat. You can take action at home to help your muscles and joints get better. You should feel better in 1 to 2 weeks, but it can take 3 months or more to heal completely. Follow-up care is a key part of your treatment and safety. Be sure to make and go to all appointments, and call your doctor if you are having problems. It's also a good idea to know your test results and keep a list of the medicines you take. How can you care for yourself at home? · Do not put weight on the injured joint for at least a day or two. · For the first day or two after an injury, do not take hot showers or baths, and do not use hot packs. The heat could make swelling worse. · Put ice or a cold pack on the sore joint for 10 to 20 minutes at a time.  Try to do this every 1 to 2 hours for the next 3 days (when you are awake) or until the swelling goes down. Put a thin cloth between the ice and your skin. · Wrap the injury in an elastic bandage. Do not wrap it too tightly because this can cause more swelling. · Prop up the sore joint on a pillow when you ice it or anytime you sit or lie down during the next 3 days. Try to keep it above the level of your heart. This will help reduce swelling. · Take an over-the-counter pain medicine, such as acetaminophen (Tylenol), ibuprofen (Advil, Motrin), or naproxen (Aleve). Read and follow all instructions on the label. · After 1 or 2 days of rest, begin moving the joint gently. While the joint is still healing, you can begin to exercise using activities that do not strain or hurt the painful joint. When should you call for help? Call your doctor now or seek immediate medical care if:    · You have signs of infection, such as:  ? Increased pain, swelling, warmth, and redness. ? Red streaks leading from the joint. ? A fever. Watch closely for changes in your health, and be sure to contact your doctor if:    · Your movement or symptoms are not getting better after 1 to 2 weeks of home treatment. Where can you learn more? Go to http://www.gray.com/  Enter P205 in the search box to learn more about \"Joint Pain: Care Instructions. \"  Current as of: July 1, 2021               Content Version: 13.2  © 2006-2022 [employer]. Care instructions adapted under license by [employer] (which disclaims liability or warranty for this information). If you have questions about a medical condition or this instruction, always ask your healthcare professional. [place] [address] any warranty or liability for your use of this information.          Multiple Sclerosis (MS): Care Instructions  Your Care Instructions  Multiple sclerosis, also called MS, is a disease that can affect the brain, spinal cord, and nerves to the eyes. MS can cause problems with muscle control and strength, vision, balance, feeling, and thinking. Whatever your symptoms are, taking medicine correctly and following your doctor's advice for home care can help you maintain your quality of life. Follow-up care is a key part of your treatment and safety. Be sure to make and go to all appointments, and call your doctor if you are having problems. It's also a good idea to know your test results and keep a list of the medicines you take. How can you care for yourself at home? General care  · Take your medicines exactly as prescribed. Call your doctor if you think you are having a problem with your medicine. · Use a cane, walker, or scooter if your doctor suggests it. · Keep doing your normal activities as much as you can. · If you have problems urinating, press or tap your bladder area to help start urine flow. If you have trouble controlling your urine, plan your fluid intake and activities so that a toilet will be available when you need it. · Spend time with family and friends. Join a support group for people with MS if you want extra help. · Depression is common with this condition. Tell your doctor if you have trouble sleeping, are eating too much or are not hungry, or feel sad or tearful all the time. Depression can be treated with medicine and counseling. Diet and exercise  · Eat a balanced diet. · If you have problems swallowing, change how and what you eat:  ? Try thick drinks, such as milk shakes. They are easier to swallow than other fluids. ? Do not eat foods that crumble easily. These can cause choking. ? Use a  to prepare food. Soft foods need less chewing. ? Eat small meals often so that you do not get tired from eating larger meals. · Get exercise on most days. Work with your doctor to set up a program of walking, swimming, or other exercise that you are able to do.  A physical therapist can teach you exercises if you cannot walk but can move your limbs and trunk. Or you can do exercises to help with coordination and balance. You can help improve muscle stiffness by doing exercises while lying in certain positions. When should you call for help? Call your doctor now or seek immediate medical care if:    · You have a change in symptoms.     · You fall or have another injury.     · You have symptoms of a urinary infection. For example:  ? You have blood or pus in your urine. ? You have pain in your back just below your rib cage. This is called flank pain. ? You have a fever, chills, or body aches. ? It hurts to urinate. ? You have groin or belly pain. Watch closely for changes in your health, and be sure to contact your doctor if:    · You want more information about MS or medicines.     · You have questions about alternative treatments. Do not use any other treatments without talking to your doctor first.   Where can you learn more? Go to http://www.gray.com/  Enter Z108 in the search box to learn more about \"Multiple Sclerosis (MS): Care Instructions. \"  Current as of: December 13, 2021               Content Version: 13.2  © 8286-4590 [employer]. Care instructions adapted under license by [employer] (which disclaims liability or warranty for this information). If you have questions about a medical condition or this instruction, always ask your healthcare professional. [place] [address] any warranty or liability for your use of this information. Follow-up and Dispositions    · Return in about 2 weeks (around 5/23/2022), or if symptoms worsen or fail to improve, for Discuss Results and Care Gaps (F/U knee pain). I have reviewed with the patient details of the assessment and plan and all questions were answered. Rele[employer] patient education was performed. The most recent lab findings were reviewed with the patient. An After Visit Summary was printed and given to the patient.

## 2022-05-18 ENCOUNTER — HOSPITAL ENCOUNTER (OUTPATIENT)
Dept: GENERAL RADIOLOGY | Age: 47
Discharge: HOME | End: 2022-05-18
Payer: OTHER GOVERNMENT

## 2022-05-18 DIAGNOSIS — M25.562 ACUTE PAIN OF LEFT KNEE: ICD-10-CM

## 2022-05-18 PROCEDURE — 73562 X-RAY EXAM OF KNEE 3: CPT

## 2022-05-19 LAB
25(OH)D3+25(OH)D2 SERPL-MCNC: 19.4 NG/ML (ref 30–100)
ALBUMIN SERPL-MCNC: 4.2 G/DL (ref 3.8–4.8)
ALBUMIN/GLOB SERPL: 1.4 {RATIO} (ref 1.2–2.2)
ALP SERPL-CCNC: 57 IU/L (ref 44–121)
ALT SERPL-CCNC: 9 IU/L (ref 0–32)
AST SERPL-CCNC: 15 IU/L (ref 0–40)
BASOPHILS # BLD AUTO: 0 X10E3/UL (ref 0–0.2)
BASOPHILS NFR BLD AUTO: 1 %
BILIRUB SERPL-MCNC: 0.6 MG/DL (ref 0–1.2)
BUN SERPL-MCNC: 16 MG/DL (ref 6–24)
BUN/CREAT SERPL: 24 (ref 9–23)
CALCIUM SERPL-MCNC: 9.4 MG/DL (ref 8.7–10.2)
CHLORIDE SERPL-SCNC: 103 MMOL/L (ref 96–106)
CHOLEST SERPL-MCNC: 180 MG/DL (ref 100–199)
CO2 SERPL-SCNC: 25 MMOL/L (ref 20–29)
CREAT SERPL-MCNC: 0.67 MG/DL (ref 0.57–1)
EGFR: 108 ML/MIN/1.73
EOSINOPHIL # BLD AUTO: 0.2 X10E3/UL (ref 0–0.4)
EOSINOPHIL NFR BLD AUTO: 4 %
ERYTHROCYTE [DISTWIDTH] IN BLOOD BY AUTOMATED COUNT: 12.8 % (ref 11.7–15.4)
EST. AVERAGE GLUCOSE BLD GHB EST-MCNC: 117 MG/DL
GLOBULIN SER CALC-MCNC: 3 G/DL (ref 1.5–4.5)
GLUCOSE SERPL-MCNC: 77 MG/DL (ref 65–99)
HBA1C MFR BLD: 5.7 % (ref 4.8–5.6)
HCT VFR BLD AUTO: 44.5 % (ref 34–46.6)
HDLC SERPL-MCNC: 70 MG/DL
HGB BLD-MCNC: 14.7 G/DL (ref 11.1–15.9)
IMM GRANULOCYTES # BLD AUTO: 0 X10E3/UL (ref 0–0.1)
IMM GRANULOCYTES NFR BLD AUTO: 0 %
IMP & REVIEW OF LAB RESULTS: NORMAL
LDLC SERPL CALC-MCNC: 98 MG/DL (ref 0–99)
LYMPHOCYTES # BLD AUTO: 0.8 X10E3/UL (ref 0.7–3.1)
LYMPHOCYTES NFR BLD AUTO: 21 %
MCH RBC QN AUTO: 31.5 PG (ref 26.6–33)
MCHC RBC AUTO-ENTMCNC: 33 G/DL (ref 31.5–35.7)
MCV RBC AUTO: 96 FL (ref 79–97)
MONOCYTES # BLD AUTO: 0.5 X10E3/UL (ref 0.1–0.9)
MONOCYTES NFR BLD AUTO: 13 %
NEUTROPHILS # BLD AUTO: 2.3 X10E3/UL (ref 1.4–7)
NEUTROPHILS NFR BLD AUTO: 61 %
PLATELET # BLD AUTO: 303 X10E3/UL (ref 150–450)
POTASSIUM SERPL-SCNC: 4.3 MMOL/L (ref 3.5–5.2)
PROT SERPL-MCNC: 7.2 G/DL (ref 6–8.5)
RBC # BLD AUTO: 4.66 X10E6/UL (ref 3.77–5.28)
SODIUM SERPL-SCNC: 139 MMOL/L (ref 134–144)
TRIGL SERPL-MCNC: 62 MG/DL (ref 0–149)
VLDLC SERPL CALC-MCNC: 12 MG/DL (ref 5–40)
WBC # BLD AUTO: 3.7 X10E3/UL (ref 3.4–10.8)

## 2022-05-23 ENCOUNTER — VIRTUAL VISIT (OUTPATIENT)
Dept: INTERNAL MEDICINE CLINIC | Age: 47
End: 2022-05-23
Payer: OTHER GOVERNMENT

## 2022-05-23 DIAGNOSIS — R79.89 ABNORMAL BUN-TO-CREATININE RATIO: ICD-10-CM

## 2022-05-23 DIAGNOSIS — R73.03 PREDIABETES: ICD-10-CM

## 2022-05-23 DIAGNOSIS — M25.562 ACUTE PAIN OF LEFT KNEE: ICD-10-CM

## 2022-05-23 DIAGNOSIS — D50.9 IRON DEFICIENCY ANEMIA, UNSPECIFIED IRON DEFICIENCY ANEMIA TYPE: ICD-10-CM

## 2022-05-23 DIAGNOSIS — G35 MULTIPLE SCLEROSIS (HCC): ICD-10-CM

## 2022-05-23 DIAGNOSIS — Z71.2 ENCOUNTER TO DISCUSS TEST RESULTS: Primary | ICD-10-CM

## 2022-05-23 DIAGNOSIS — E55.9 VITAMIN D DEFICIENCY: ICD-10-CM

## 2022-05-23 PROCEDURE — 99214 OFFICE O/P EST MOD 30 MIN: CPT | Performed by: NURSE PRACTITIONER

## 2022-05-23 RX ORDER — ERGOCALCIFEROL 1.25 MG/1
50000 CAPSULE ORAL
Qty: 12 CAPSULE | Refills: 0 | Status: SHIPPED | OUTPATIENT
Start: 2022-05-23 | End: 2022-08-21

## 2022-05-23 NOTE — PROGRESS NOTES
Chief Complaint   Patient presents with    Knee Pain     follow up     3 most recent PHQ Screens 5/23/2022   Little interest or pleasure in doing things Not at all   Feeling down, depressed, irritable, or hopeless Not at all   Total Score PHQ 2 0   Trouble falling or staying asleep, or sleeping too much -   Feeling tired or having little energy -   Poor appetite, weight loss, or overeating -   Feeling bad about yourself - or that you are a failure or have let yourself or your family down -   Trouble concentrating on things such as school, work, reading, or watching TV -   Moving or speaking so slowly that other people could have noticed; or the opposite being so fidgety that others notice -   Thoughts of being better off dead, or hurting yourself in some way -   PHQ 9 Score -   How difficult have these problems made it for you to do your work, take care of your home and get along with others -     1. Have you been to the ER, urgent care clinic since your last visit? Hospitalized since your last visit?no    2. Have you seen or consulted any other health care providers outside of the [address] [place] [address] since your last visit? Include any pap smears or colon screening.  no Patient called to request refill of Lisinopril 5 mg     [facility] [place]     90 day supply

## 2022-05-23 NOTE — PATIENT INSTRUCTIONS
Prediabetes: Care Instructions  Overview     Prediabetes is a warning sign that you're at risk for getting type 2 diabetes. It means that your blood sugar is higher than it should be. But it's not high enough to be diabetes. The food you eat naturally turns into sugar. Your body uses the sugar for energy. Normally, an organ called the pancreas makes insulin. And insulin allows the sugar in your blood to get into your body's cells. But sometimes the body can't use insulin the right way. So the sugar stays in your blood instead. This is called insulin resistance. The buildup of sugar in your blood means you have prediabetes. The good news is that you may be able to prevent or delay diabetes. Making small lifestyle changes, like getting active and changing your eating habits, may help you get your blood sugar back to normal. You can work with your doctor to make a treatment plan. Follow-up care is a key part of your treatment and safety. Be sure to make and go to all appointments, and call your doctor if you are having problems. It's also a good idea to know your test results and keep a list of the medicines you take. How can you care for yourself at home? · Watch your weight. A healthy weight helps your body use insulin properly. · Limit the amount of calories, sweets, and unhealthy fat you eat. Ask your doctor if you should see a dietitian. A registered dietitian can help you create meal plans that fit your lifestyle. · Get at least 30 minutes of exercise on most days of the week. Exercise helps control your blood sugar. It also helps you maintain a healthy weight. Walking is a good choice. You also may want to do other activities, such as running, swimming, cycling, or playing tennis or team sports. · Do not smoke. Smoking can make prediabetes worse. If you need help quitting, talk to your doctor about stop-smoking programs and medicines. These can increase your chances of quitting for good.   · If your doctor prescribed medicines, take them exactly as prescribed. Call your doctor if you think you are having a problem with your medicine. You will get more details on the specific medicines your doctor prescribes. When should you call for help? Watch closely for changes in your health, and be sure to contact your doctor if:    · You have any symptoms of diabetes. These may include:  ? Being thirsty more often. ? Urinating more. ? Being hungrier. ? Losing weight. ? Being very tired. ? Having blurry vision.     · You have a wound that will not heal.     · You have an infection that will not go away.     · You have problems with your blood pressure.     · You want more information about diabetes and how you can keep from getting it. Where can you learn more? Go to http://www.gray.com/  Enter I222 in the search box to learn more about \"Prediabetes: Care Instructions. \"  Current as of: July 28, 2021               Content Version: 13.2  © 2006-2022 [employer]. Care instructions adapted under license by [employer] (which disclaims liability or warranty for this information). If you have questions about a medical condition or this instruction, always ask your healthcare professional. [place] [address] any warranty or liability for your use of this information. Knee Pain or Injury: Care Instructions  Your Care Instructions     Injuries are a common cause of knee problems. Sudden (acute) injuries may be caused by a direct blow to the knee. They can also be caused by abnormal twisting, bending, or falling on the knee. Pain, bruising, or swelling may be severe, and may start within minutes of the injury. Overuse is another cause of knee pain. Other causes are climbing stairs, kneeling, and other activities that use the knee. Everyday wear and tear, especially as you get older, also can cause knee pain.   Rest, along with home treatment, often relieves pain and allows your knee to heal. If you have a serious knee injury, you may need tests and treatment. Follow-up care is a key part of your treatment and safety. Be sure to make and go to all appointments, and call your doctor if you are having problems. It's also a good idea to know your test results and keep a list of the medicines you take. How can you care for yourself at home? · Be safe with medicines. Read and follow all instructions on the label. ? If the doctor gave you a prescription medicine for pain, take it as prescribed. ? If you are not taking a prescription pain medicine, ask your doctor if you can take an over-the-counter medicine. · Rest and protect your knee. Take a break from any activity that may cause pain. · Put ice or a cold pack on your knee for 10 to 20 minutes at a time. Put a thin cloth between the ice and your skin. · Prop up a sore knee on a pillow when you ice it or anytime you sit or lie down for the next 3 days. Try to keep it above the level of your heart. This will help reduce swelling. · If your knee is not swollen, you can put moist heat, a heating pad, or a warm cloth on your knee. · If your doctor recommends an elastic bandage, sleeve, or other type of support for your knee, wear it as directed. · Follow your doctor's instructions about how much weight you can put on your leg. Use a cane, crutches, or a walker as instructed. · Follow your doctor's instructions about activity during your healing process. If you can do mild exercise, slowly increase your activity. · Reach and stay at a healthy weight. Extra weight can strain the joints, especially the knees and hips, and make the pain worse. Losing even a few pounds may help. When should you call for help? Call 911 anytime you think you may need emergency care. For example, call if:    · You have symptoms of a blood clot in your lung (called a pulmonary embolism).  These may include:  ? Sudden chest pain.  ? Trouble breathing. ? Coughing up blood. Call your doctor now or seek immediate medical care if:    · You have severe or increasing pain.     · Your leg or foot turns cold or changes color.     · You cannot stand or put weight on your knee.     · Your knee looks twisted or bent out of shape.     · You cannot move your knee.     · You have signs of infection, such as:  ? Increased pain, swelling, warmth, or redness. ? Red streaks leading from the knee. ? Pus draining from a place on your knee. ? A fever.     · You have signs of a blood clot in your leg (called a deep vein thrombosis), such as:  ? Pain in your calf, back of the knee, thigh, or groin. ? Redness and swelling in your leg or groin. Watch closely for changes in your health, and be sure to contact your doctor if:    · You have tingling, weakness, or numbness in your knee.     · You have any new symptoms, such as swelling.     · You have bruises from a knee injury that last longer than 2 weeks.     · You do not get better as expected. Where can you learn more? Go to http://www.gray.com/  Enter K195 in the search box to learn more about \"Knee Pain or Injury: Care Instructions. \"  Current as of: July 1, 2021               Content Version: 13.2  © 2006-2022 [employer]. Care instructions adapted under license by [employer] (which disclaims liability or warranty for this information). If you have questions about a medical condition or this instruction, always ask your healthcare professional. [place] [address] any warranty or liability for your use of this information.

## 2022-05-23 NOTE — PROGRESS NOTES
Subjective:  Minerva Drake is a 51 y.o. female and presents with Multiple Sclerosis, Anemia, and Vitamin D deficiency to Discuss Results (Pre-Diabetes) and for Follow-up of Knee Pain (follow up)    Multiple Sclerosis Review:  Patient is presenting today for evaluation and treatment of multiple sclerosis, not currently in any distress. Patient denies visual symptoms including persistent visual blurring , subdued color perception and diplopia, sensory symptoms, motor symptoms, cerebellar symptoms, cranial nerve symptoms, urinary tract symptoms, cognitive symptoms, sexual symptoms, affective symptoms and constitutional symptoms.     Anemia  Patient presents for  evaluation of anemia. Anemia was found by routine CBC. It has been present for several months. Associated signs & symptoms: fatigue     Seasonal/Environmental Allergies Review  Patient presents for follow up evaluation of allergy symptoms. She reports having nasal congestion, rhinorrhea, sneezing, eye itching, watery eyes. Precipitants include pollen. Treatment in the past has included intranasal steroids. Treatment currently includes intranasal steroids and antihistamines which are effective in the patient's opinion.     Vitamin D Deficiency Review  Patient presents with Vitamin D deficiency. Patient denies symptoms of bone pain, muscle weakness, Fatigue or other neurological symptoms. Labs were ordered at patient's last office visit to assess the need for continued  Vitamin D replacement.      Pre-Diabetes Mellitus Review:  Patient is presenting for evaluation and follow up for Pre-Diabetes Mellitus Type II. Diabetic ROS - Diabetic diet compliance: Not compliant all of the time, home glucose monitoring: is not performed.    Known diabetic complications: none  Cardiovascular risk factors: family history, dyslipidemia, diabetes mellitus, obesity, hypertension  Current diabetic medications: Not currently taking medications, trying to manage blood sugar with lifestyle changes. Eye exam current (within one year): yes  Weight trend: stable  Prior visit with dietician: no  Current diet: trying to adhere to a low sugar diet   Current exercise: none    Review of Systems  Constitutional: negative for fevers, chills, anorexia and weight loss  Eyes:   negative for visual disturbance and irritation  ENT:   negative for tinnitus,sore throat,nasal congestion,ear pains. hoarseness  Respiratory:  negative for cough, hemoptysis, dyspnea,wheezing  CV:   negative for chest pain, palpitations, lower extremity edema  GI:   negative for nausea, vomiting, diarrhea, abdominal pain,melena  Endo:               negative for polyuria,polydipsia,polyphagia,heat intolerance  Genitourinary: negative for frequency, dysuria and hematuria  Integument:  negative for rash and pruritus  Hematologic:  negative for easy bruising and gum/nose bleeding  Musculoskel: positive for myalgias, arthralgias and  joint pain (left knee)  Neurological:  negative for headaches, dizziness, vertigo, memory problems and gait   Behavl/Psych: negative for feelings of anxiety, depression, mood changes    Past Medical History:   Diagnosis Date    History of shingles     MS (multiple sclerosis) ([facility] [address]      Past Surgical History:   Procedure Laterality Date    HX HYSTERECTOMY  02/2016    left ovaries    HX ORTHOPAEDIC  2001, 2003    bilat feet, bunions removed, bones removed from pinky     Social History     Socioeconomic History    Marital status:    Tobacco Use    Smoking status: Never Smoker    Smokeless tobacco: Never Used   Vaping Use    Vaping Use: Never used   Substance and Sexual Activity    Alcohol use: No    Drug use: No    Sexual activity: Not Currently     Family History   Problem Relation Age of Onset    Hypertension Mother     Elevated Lipids Mother     Diabetes Paternal Grandmother     Diabetes Paternal Grandfather      Current Outpatient Medications   Medication Sig Dispense Refill  ergocalciferol (ERGOCALCIFEROL) 1,250 mcg (50,000 unit) capsule Take 1 Capsule by mouth every seven (7) days for 90 days. Indications: low vitamin D levels 12 Capsule 0    ibuprofen (MOTRIN) 400 mg tablet Take 1 Tablet by mouth every eight (8) hours as needed for Pain. Indications: pain 30 Tablet 0    cetirizine (ZYRTEC) 10 mg tablet Take 1 Tablet by mouth nightly. 30 Tablet 0    ocrelizumab (OCREVUS IV) by IntraVENous route. Allergies   Allergen Reactions    Toradol [Ketorolac] Other (comments)     Only Toradol, uses ibuprofen regularly with no complications or side effects. Objective: There were no vitals taken for this visit. Physical Exam:   General appearance - alert, well appearing, and in no distress  Mental status - alert, oriented to person, place, and time    Kallie Gupta is a 51 y.o. female being evaluated by a Virtual Visit (telephonic visit) encounter to address concerns as mentioned above. Due to this being a TeleHealth encounter (During LNSXI-93 public health emergency), evaluation of the following organ systems was limited: Vitals/Constitutional/EENT/Resp/CV/GI//MS/Neuro/Skin/Heme-Lymph-Imm. Pursuant to the emergency declaration under the [address] [place] [address] authority and the [employer] Act, this Virtual Visit was conducted with patient's (and/or legal guardian's) consent, to reduce the risk of exposure to COVID-19 and provide necessary medical care. Services were provided virtually to substitute for in-person encounter.     Results for orders placed or performed in visit on 05/09/22   CBC WITH AUTOMATED DIFF   Result Value Ref [place]    WBC 3.7 3.4 - 10.8 x10E3/uL    RBC 4.66 3.77 - 5.28 x10E6/uL    HGB 14.7 11.1 - 15.9 g/dL    HCT 44.5 34.0 - 46.6 %    MCV 96 79 - 97 fL    MCH 31.5 26.6 - 33.0 pg    MCHC 33.0 31.5 - 35.7 g/dL    RDW 12.8 11.7 - 15.4 %    PLATELET 902 581 - 450 x10E3/uL    NEUTROPHILS 61 Not Estab. %    Lymphocytes 21 Not Estab. %    MONOCYTES 13 Not Estab. %    EOSINOPHILS 4 Not Estab. %    BASOPHILS 1 Not Estab. %    ABS. NEUTROPHILS 2.3 1.4 - 7.0 x10E3/uL    Abs Lymphocytes 0.8 0.7 - 3.1 x10E3/uL    ABS. MONOCYTES 0.5 0.1 - 0.9 x10E3/uL    ABS. EOSINOPHILS 0.2 0.0 - 0.4 x10E3/uL    ABS. BASOPHILS 0.0 0.0 - 0.2 x10E3/uL    IMMATURE GRANULOCYTES 0 Not Estab. %    ABS. IMM. GRANS. 0.0 0.0 - 0.1 [id number]/AY   METABOLIC PANEL, COMPREHENSIVE   Result Value Ref [place]    Glucose 77 65 - 99 mg/dL    BUN 16 6 - 24 mg/dL    Creatinine 0.67 0.57 - 1.00 mg/dL    eGFR 108 >59 mL/min/1.73    BUN/Creatinine ratio 24 (H) 9 - 23    Sodium 139 134 - 144 mmol/L    Potassium 4.3 3.5 - 5.2 mmol/L    Chloride 103 96 - 106 mmol/L    CO2 25 20 - 29 mmol/L    Calcium 9.4 8.7 - 10.2 mg/dL    Protein, total 7.2 6.0 - 8.5 g/dL    Albumin 4.2 3.8 - 4.8 g/dL    GLOBULIN, TOTAL 3.0 1.5 - 4.5 g/dL    A-G Ratio 1.4 1.2 - 2.2    Bilirubin, total 0.6 0.0 - 1.2 mg/dL    Alk. phosphatase 57 44 - 121 IU/L    AST (SGOT) 15 0 - 40 IU/L    ALT (SGPT) 9 0 - 32 IU/L   LIPID PANEL   Result Value Ref [place]    Cholesterol, total 180 100 - 199 mg/dL    Triglyceride 62 0 - 149 mg/dL    HDL Cholesterol 70 >39 mg/dL    VLDL, calculated 12 5 - 40 mg/dL    LDL, calculated 98 0 - 99 mg/dL   HEMOGLOBIN A1C WITH EAG   Result Value Ref [place]    Hemoglobin A1c 5.7 (H) 4.8 - 5.6 %    Estimated average glucose 117 mg/dL   VITAMIN D, 25 HYDROXY   Result Value Ref [place]    VITAMIN D, 25-HYDROXY 19.4 (L) 30.0 - 100.0 ng/mL   CVD REPORT   Result Value Ref [place]    INTERPRETATION Note        Assessment/Plan:    ICD-10-CM ICD-9-CM    1. Encounter to discuss test results  Z71.2 V65.49    2. Multiple sclerosis ([facility]  G35 340    3. Iron deficiency anemia, unspecified iron deficiency anemia type  D50.9 280.9    4. Prediabetes  R73.03 790.29    5.  Vitamin D deficiency  E55.9 268.9 ergocalciferol (ERGOCALCIFEROL) 1,250 mcg (50,000 unit) capsule   6. Abnormal BUN-to-creatinine ratio  R79.89 790.99    7. Acute pain of left knee  M25.562 719.46 REFERRAL TO ORTHOPEDICS     Orders Placed This Encounter    [facility] EM[facility]     Referral Priority:   Routine     Referral Type:   Consultation     Referral Reason:   Specialty Services Required     Referred to Provider:   Sadie Alvarado DO     Number of Visits Requested:   1    ergocalciferol (ERGOCALCIFEROL) 1,250 mcg (50,000 unit) capsule     Sig: Take 1 Capsule by mouth every seven (7) days for 90 days. Indications: low vitamin D levels     Dispense:  12 Capsule     Refill:  0     Patient Instructions          Prediabetes: Care Instructions  Overview     Prediabetes is a warning sign that you're at risk for getting type 2 diabetes. It means that your blood sugar is higher than it should be. But it's not high enough to be diabetes. The food you eat naturally turns into sugar. Your body uses the sugar for energy. Normally, an organ called the pancreas makes insulin. And insulin allows the sugar in your blood to get into your body's cells. But sometimes the body can't use insulin the right way. So the sugar stays in your blood instead. This is called insulin resistance. The buildup of sugar in your blood means you have prediabetes. The good news is that you may be able to prevent or delay diabetes. Making small lifestyle changes, like getting active and changing your eating habits, may help you get your blood sugar back to normal. You can work with your doctor to make a treatment plan. Follow-up care is a key part of your treatment and safety. Be sure to make and go to all appointments, and call your doctor if you are having problems. It's also a good idea to know your test results and keep a list of the medicines you take. How can you care for yourself at home? · Watch your weight. A healthy weight helps your body use insulin properly.   · Limit the amount of calories, sweets, and unhealthy fat you eat. Ask your doctor if you should see a dietitian. A registered dietitian can help you create meal plans that fit your lifestyle. · Get at least 30 minutes of exercise on most days of the week. Exercise helps control your blood sugar. It also helps you maintain a healthy weight. Walking is a good choice. You also may want to do other activities, such as running, swimming, cycling, or playing tennis or team sports. · Do not smoke. Smoking can make prediabetes worse. If you need help quitting, talk to your doctor about stop-smoking programs and medicines. These can increase your chances of quitting for good. · If your doctor prescribed medicines, take them exactly as prescribed. Call your doctor if you think you are having a problem with your medicine. You will get more details on the specific medicines your doctor prescribes. When should you call for help? Watch closely for changes in your health, and be sure to contact your doctor if:    · You have any symptoms of diabetes. These may include:  ? Being thirsty more often. ? Urinating more. ? Being hungrier. ? Losing weight. ? Being very tired. ? Having blurry vision.     · You have a wound that will not heal.     · You have an infection that will not go away.     · You have problems with your blood pressure.     · You want more information about diabetes and how you can keep from getting it. Where can you learn more? Go to http://www.gray.com/  Enter I222 in the search box to learn more about \"Prediabetes: Care Instructions. \"  Current as of: July 28, 2021               Content Version: 13.2  © 2006-2022 [employer]. Care instructions adapted under license by [employer] (which disclaims liability or warranty for this information).  If you have questions about a medical condition or this instruction, always ask your healthcare professional. [place] [address] any warranty or liability for your use of this information. Knee Pain or Injury: Care Instructions  Your Care Instructions     Injuries are a common cause of knee problems. Sudden (acute) injuries may be caused by a direct blow to the knee. They can also be caused by abnormal twisting, bending, or falling on the knee. Pain, bruising, or swelling may be severe, and may start within minutes of the injury. Overuse is another cause of knee pain. Other causes are climbing stairs, kneeling, and other activities that use the knee. Everyday wear and tear, especially as you get older, also can cause knee pain. Rest, along with home treatment, often relieves pain and allows your knee to heal. If you have a serious knee injury, you may need tests and treatment. Follow-up care is a key part of your treatment and safety. Be sure to make and go to all appointments, and call your doctor if you are having problems. It's also a good idea to know your test results and keep a list of the medicines you take. How can you care for yourself at home? · Be safe with medicines. Read and follow all instructions on the label. ? If the doctor gave you a prescription medicine for pain, take it as prescribed. ? If you are not taking a prescription pain medicine, ask your doctor if you can take an over-the-counter medicine. · Rest and protect your knee. Take a break from any activity that may cause pain. · Put ice or a cold pack on your knee for 10 to 20 minutes at a time. Put a thin cloth between the ice and your skin. · Prop up a sore knee on a pillow when you ice it or anytime you sit or lie down for the next 3 days. Try to keep it above the level of your heart. This will help reduce swelling. · If your knee is not swollen, you can put moist heat, a heating pad, or a warm cloth on your knee. · If your doctor recommends an elastic bandage, sleeve, or other type of support for your knee, wear it as directed.   · Follow your doctor's instructions about how much weight you can put on your leg. Use a cane, crutches, or a walker as instructed. · Follow your doctor's instructions about activity during your healing process. If you can do mild exercise, slowly increase your activity. · Reach and stay at a healthy weight. Extra weight can strain the joints, especially the knees and hips, and make the pain worse. Losing even a few pounds may help. When should you call for help? Call 911 anytime you think you may need emergency care. For example, call if:    · You have symptoms of a blood clot in your lung (called a pulmonary embolism). These may include:  ? Sudden chest pain. ? Trouble breathing. ? Coughing up blood. Call your doctor now or seek immediate medical care if:    · You have severe or increasing pain.     · Your leg or foot turns cold or changes color.     · You cannot stand or put weight on your knee.     · Your knee looks twisted or bent out of shape.     · You cannot move your knee.     · You have signs of infection, such as:  ? Increased pain, swelling, warmth, or redness. ? Red streaks leading from the knee. ? Pus draining from a place on your knee. ? A fever.     · You have signs of a blood clot in your leg (called a deep vein thrombosis), such as:  ? Pain in your calf, back of the knee, thigh, or groin. ? Redness and swelling in your leg or groin. Watch closely for changes in your health, and be sure to contact your doctor if:    · You have tingling, weakness, or numbness in your knee.     · You have any new symptoms, such as swelling.     · You have bruises from a knee injury that last longer than 2 weeks.     · You do not get better as expected. Where can you learn more? Go to http://www.gray.com/  Enter K195 in the search box to learn more about \"Knee Pain or Injury: Care Instructions. \"  Current as of: July 1, 2021               Content Version: 13.2  © 2667-5647 [employer].    Care instructions adapted under license by [employer] (which disclaims liability or warranty for this information). If you have questions about a medical condition or this instruction, always ask your healthcare professional. [address] any warranty or liability for your use of this information. Follow-up and Dispositions    · Return in about 3 months (around 8/23/2022), or if symptoms worsen or fail to improve, for 3 month CPE and Labs. Follow-up and Disposition History       I have reviewed with the patient details of the assessment and plan and all questions were answered. Relevent patient education was performed. The most recent lab findings were reviewed with the patient. An After Visit Summary was printed and given to the patient.

## 2022-06-24 ENCOUNTER — OFFICE VISIT (OUTPATIENT)
Dept: ORTHOPEDIC SURGERY | Age: 47
End: 2022-06-24
Payer: OTHER GOVERNMENT

## 2022-06-24 VITALS
RESPIRATION RATE: 16 BRPM | WEIGHT: 196.2 LBS | OXYGEN SATURATION: 100 % | SYSTOLIC BLOOD PRESSURE: 94 MMHG | HEIGHT: 66 IN | TEMPERATURE: 96.7 F | BODY MASS INDEX: 31.53 KG/M2 | DIASTOLIC BLOOD PRESSURE: 65 MMHG | HEART RATE: 76 BPM

## 2022-06-24 DIAGNOSIS — M76.52 PATELLAR TENDINITIS, LEFT KNEE: Primary | ICD-10-CM

## 2022-06-24 PROCEDURE — 99203 OFFICE O/P NEW LOW 30 MIN: CPT | Performed by: ORTHOPAEDIC SURGERY

## 2022-06-24 NOTE — PROGRESS NOTES
6/24/2022    Chief Complaint: Left knee pain    HPI: This is a(n) 51 y.o. female  who complains of left knee pain. Onset was after she had done a lot of climbing stairs and lifting 3 weeks ago. The patient has had pain for 3 weeks. The pain is in the anterior knee, it is improving in intensity. The patient has tried activity modification, no physical therapy, injections have not been attempted. The pain causes some limitation with walking and stairs. The patient does not complain of feelings of instability in the knee. Past Medical History:   Diagnosis Date    History of shingles     MS (multiple sclerosis) ([facility] [address]        Past Surgical History:   Procedure Laterality Date    HX HYSTERECTOMY  02/2016    left ovaries    HX ORTHOPAEDIC  2001, 2003    bilat feet, bunions removed, bones removed from pinky       Current Outpatient Medications on File Prior to Visit   Medication Sig Dispense Refill    ergocalciferol (ERGOCALCIFEROL) 1,250 mcg (50,000 unit) capsule Take 1 Capsule by mouth every seven (7) days for 90 days. Indications: low vitamin D levels 12 Capsule 0    ibuprofen (MOTRIN) 400 mg tablet Take 1 Tablet by mouth every eight (8) hours as needed for Pain. Indications: pain 30 Tablet 0    cetirizine (ZYRTEC) 10 mg tablet Take 1 Tablet by mouth nightly. 30 Tablet 0    ocrelizumab (OCREVUS IV) by IntraVENous route. No current facility-administered medications on file prior to visit. Allergies   Allergen Reactions    Toradol [Ketorolac] Other (comments)     Only Toradol, uses ibuprofen regularly with no complications or side effects.        Family History   Problem Relation Age of Onset    Hypertension Mother     Elevated Lipids Mother     Diabetes Paternal Grandmother     Diabetes Paternal Grandfather        Social History     Socioeconomic History    Marital status:    Tobacco Use    Smoking status: Never Smoker    Smokeless tobacco: Never Used   Vaping Use    Vaping Use: Never used   Substance and Sexual Activity    Alcohol use: No    Drug use: No    Sexual activity: Not Currently     Social Determinants of Health     Physical Activity: Insufficiently Active    Days of Exercise per Week: 6 days    Minutes of Exercise per Session: 20 min         Review of Systems:       General: Denies headache, lethargy, fever, weight loss  Ears/Nose/Throat: Denies ear discharge, drainage, nosebleeds, hoarse voice, dental problems  Cardiovascular: Denies chest pain, shortness of breath  Lungs: Denies chest pain, breathing problems, wheezing, pneumonia  Stomach: Denies stomach pain, heartburn, constipation, irritable bowel  Skin: Denies rash, sores, open wounds  Musculoskeletal: Admits to knee pain, no deformity. Genitourinary: Denies dysuria, hematuria, polyuria  Gastrointestinal: Denies constipation, obstipation, diarrhea  Neurological: Denies changes in sight, smell, hearing, taste, seizures. Denies loss of consciousness.   Psychiatric: Denies depression, sleep pattern changes, anxiety, change in personality  Endocrine: Denies mood swings, heat or cold intolerance  Hematologic/Lymphatic: Denies anemia, purpura, petechia  Allergic/Immunologic: Denies swelling of throat, pain or swelling at lymph nodes      Physical Examination:    Visit Vitals  BP 94/65 (BP 1 Location: Left upper arm, BP Patient Position: Sitting, BP Cuff Size: Adult)   Pulse 76   Temp (!) 96.7 °F (35.9 °C) (Temporal)   Resp 16   Ht 5' 6\" (1.676 m)   Wt 196 lb 3.2 oz (89 kg)   SpO2 100%   BMI 31.67 kg/m²        General: AOX3, no apparent distress  Psychiatric: mood and affect appropriate  Lungs: breathing is symmetric and unlabored bilaterally  Heart: regular rate and rhythm  Abdomen: no guarding  Head: normocephalic, atraumatic  Skin: No significant abnormalities, good turgor  Sensation intact to light touch: L1-S1 dermatomes  Muscular exam: 5/5 strength in all major muscle groups unless noted in specialty exam.    Extremities:      Left upper extremity: Full active and passive range of motion without pain, deformity, no open wound, strength 5/5 in all major muscle groups. Right upper extremity: Full active and passive range of motion without pain, deformity, no open wound, strength 5/5 in all major muscle groups. Right lower extremity: Full active and passive range of motion without pain, deformity, no open wound, strength 5/5 in all major muscle groups. Left lower extremity:  No deformity is noted. Range of motion of the knee is 0-1 25. Ligamentous testing of the knee indicates stability of the the ACL, PCL, MCL, LCL. Joint line tenderness to palpation negative, however she does have tenderness palpation along the patella tendon and inferior pole of the patella. Popliteal area is unremarkable. Negative effusion. No patellar crepitus. Patella tracks centrally with a negative apprehension and grind test.  Pivot shift is negative. Strength testing is indicative of 5/5 strength at hip flexion, extension, knee flexion and extension, tibialis anterior, EHL, and FHL. Sensation is intact to light touch in the L1-S1 dermatomes. Capillary refill is less than 2 seconds in the toes. Diagnostics:    Pertinent Diagnostics:   Xrays of the right knee indicate no fractures, osseus lesions, abnormalities, cartilage space is well maintained. Overall alignment is within normal limits, no effusion or other soft tissue abnormality. Assessment: Pain in left knee, patella tendinitis    Plan: This patient I went over her treatment options as well as the pathophysiology, I drawn this out in the office today. The plan be to have her proceed with physical therapy, take anti-inflammatories, ice and to treat this conservatively over the course of the next several weeks. She will follow-up in a month for repeat clinical check. She stated understanding satisfaction.         Ms. Rich Palencia has a reminder for a \"due or due soon\" health maintenance. I have asked that she contact her primary care provider for follow-up on this health maintenance.

## 2022-06-24 NOTE — LETTER
6/24/2022    Patient: Nik Ann   YOB: 1975   Date of Visit: 6/24/2022     Nga Kaufman NP  [address] [address]  Via In Basket    Dear Nga Kaufman NP,      Thank you for referring Ms. Bard Dance to [facility] for evaluation. My notes for this consultation are attached. If you have questions, please do not hesitate to call me. I look forward to following your patient along with you.       Sincerely,    Gregory Martin, DO

## 2022-06-24 NOTE — PROGRESS NOTES
Donah Rubinstein is a 51 y.o. female    Chief Complaint   Patient presents with    New Patient    Knee Pain     left knee      Visit Vitals  BP 94/65 (BP 1 Location: Left upper arm, BP Patient Position: Sitting, BP Cuff Size: Adult)   Pulse 76   Temp (!) 96.7 °F (35.9 °C) (Temporal)   Resp 16   Ht 5' 6\" (1.676 m)   Wt 196 lb 3.2 oz (89 kg)   SpO2 100%   BMI 31.67 kg/m²

## 2022-07-05 ENCOUNTER — HOSPITAL ENCOUNTER (OUTPATIENT)
Dept: PHYSICAL THERAPY | Age: 47
Discharge: HOME | End: 2022-07-05
Payer: OTHER GOVERNMENT

## 2022-07-05 PROCEDURE — 97161 PT EVAL LOW COMPLEX 20 MIN: CPT | Performed by: PHYSICAL THERAPIST

## 2022-07-05 PROCEDURE — 97110 THERAPEUTIC EXERCISES: CPT | Performed by: PHYSICAL THERAPIST

## 2022-07-05 NOTE — PROGRESS NOTES
PT INITIAL EVALUATION NOTE 2-15    Patient Name: Ashley Sparks  Date:2022  : 1975  [x]  Patient  Verified  Payor: Smita Rivera / Plan: Chauncey Morrison / Product Type: Commerical /    In time:919  Out time:1000  Total Treatment Time (min): 41  Visit #: 1     Treatment Area: Left knee pain [M25.562]    SUBJECTIVE  Pain Level (0-10 scale): 1  Any medication changes, allergies to medications, adverse drug reactions, diagnosis change, or new procedure performed?: [] No    [x] Yes (see summary sheet for update)  Subjective:     Pt first noticed onset of pain in April after lifting a food cart. She used a lidocaine patch on the knee which she is unsure if beneficial but thinks that in her head it makes it better. She is avoiding bending or kneeling. She struggles to climb into her bed which is a little high. She denies locking, buckling, giving way at this time. She has not tried any ice at this time. She has been limited with walking her dogs. Her area of discomfort is over the patellar tendon. She does have some discomfort with stairs after a period of time. She does report that she has relapsing-remitting MS. Her last major flare-up was in .         OBJECTIVE/EXAMINATION  Posture:  rounded shoulders and forward head  Other Observations:  Poor stability in the core  Gait and Functional Mobility:  Bilateral hip drop right > left, toe out bilaterally  Palpation: tenderness noted over the patella tendon    Right Knee AROM 0-127  Left Knee AROM 0-125     Joint Mobility Assessment: patella mobility is good    Flexibility: NT    LOWER QUARTER   MUSCLE STRENGTH  KEY       R  L  0 - No Contraction  Knee ext  5  5  1 - Trace            flex  5  4P!  2 - Poor   Hip ext   NT  NT  3 - Fair          flex   4  4  4 - Good         abd  3  2  5 - Normal         add  NT  NT            ER   3  3      Ankle DF  4  4                PF  NT  NT                INV  NT  NT                EV  NT  NT    Neurological: Reflexes / Sensations: diminished sensation to light touch right > left in the lower leg  Special Tests: Trendelenberg: +    Stork: +      Raheel: +     Antony: NT                 H.S. SLR: -    Piriformis Ext: NT      Long Sit: NT     Hip Scour: NT      Knee Varus/Valgus Stress: NT  Knee Lachmans: NT      Thesslay Test: NT    15 min Therapeutic Exercise:  [x] See flow sheet :   Rationale: increase ROM, increase strength, improve coordination, improve balance and increase proprioception to improve the patients ability to ***    *** min Therapeutic Activity:  []  See flow sheet :   Rationale: {BSHSI IMMOTION THER EX:56957}  to improve the patients ability to ***     *** min Neuromuscular Re-education:  []  See flow sheet :   Rationale: {BSHSI IMMOTION THER EX:18333}  to improve the patients ability to ***    *** min Self Care/Home Management:  []  See flow sheet :   Rationale: {BSHSI IMMOTION THER EX:68468}  to improve the patients ability to ***    *** min Manual Therapy:  ***   Rationale: {BSHSI IMMOTION MANUAL THERAPY:31683}  to improve the patients ability to ***    *** min Gait Training:  ___ feet with ___ device on level surfaces with ___ level of assist   Rationale: {BSHSI IMMOTION THER EX:10495}  to improve the patients ability to ***          With   [] TE   [] TA   [] Neuro   [] SC   [] other: Patient Education: [x] Review HEP    [] Progressed/Changed HEP based on:   [] positioning   [] body mechanics   [] transfers   [] heat/ice application    [] other:        Other Objective/Functional Measures: FOTO Functional Measure: ***/100              ***    Pain Level (0-10 scale) post treatment: ***      ASSESSMENT:      [x]  See Plan of Nayeli, PT 7/5/2022

## 2022-07-12 ENCOUNTER — HOSPITAL ENCOUNTER (OUTPATIENT)
Dept: PHYSICAL THERAPY | Age: 47
Discharge: HOME | End: 2022-07-12
Payer: OTHER GOVERNMENT

## 2022-07-12 PROCEDURE — 97110 THERAPEUTIC EXERCISES: CPT

## 2022-07-12 NOTE — PROGRESS NOTES
PT DAILY TREATMENT NOTE 2-15    Patient Name: Fabiola Miller  Date:2022  : 1975  [x]  Patient  Verified  Payor: Kia Jeff / Plan: Bib Urbano / Product Type: Commerical /    In time: 5:00P  Out time: 5:47P  Total Treatment Time (min): 47  Visit #:  2    Treatment Area: Left knee pain [M25.562]    SUBJECTIVE  Pain Level (0-10 scale): 210  Any medication changes, allergies to medications, adverse drug reactions, diagnosis change, or new procedure performed?: [x] No    [] Yes (see summary sheet for update)  Subjective functional status/changes:   [] No changes reported  Pt noted they didn't have any increases in pain since previous treatment session, did note some soreness following previous exercises, mostly in the L quadriceps muscle group. Pt also noted going up and down stairs seemed to be a lot of work for the knee, but they were able to go up and down without increasing symptoms. Pt noted adding RTB to bridges seemed to make them \"more challenging,\". Pt noted some increased soreness following today's treatment session but without any increase in pain. OBJECTIVE      47 min Therapeutic Exercise:  [x] See flow sheet :   Rationale: increase ROM, increase strength and increase proprioception to improve the patients ability to complete ADLs and reduce pain. With   [x] TE   [] TA   [] Neuro   [] SC   [] other: Patient Education: [] Review HEP    [] Progressed/Changed HEP based on:   [x] positioning   [x] body mechanics   [] transfers   [] heat/ice application    [] other:      Other Objective/Functional Measures: N/A     Pain Level (0-10 scale) post treatment: 10    ASSESSMENT/Changes in Function:   Patient tolerated treatment session well today, no increase in pain symptoms. Pt demonstrated B genu valgus during sit to stands, able to correct following verbal cues and using RTB as tactile cue.    Patient will continue to benefit from skilled PT services to modify and progress therapeutic interventions, address functional mobility deficits, address strength deficits, analyze and cue movement patterns and analyze and modify body mechanics/ergonomics to attain remaining goals. []  See Plan of Care  []  See progress note/recertification  []  See Discharge Summary         Progress towards goals / Updated goals:  Progressing towards goals.     PLAN  [x]  Upgrade activities as tolerated     [x]  Continue plan of care  [x]  Update interventions per flow sheet       []  Discharge due to:_  []  Other:_      Marquis Ross, PTA 7/12/2022

## 2022-07-14 ENCOUNTER — APPOINTMENT (OUTPATIENT)
Dept: PHYSICAL THERAPY | Age: 47
End: 2022-07-14
Payer: OTHER GOVERNMENT

## 2022-07-19 ENCOUNTER — HOSPITAL ENCOUNTER (OUTPATIENT)
Dept: PHYSICAL THERAPY | Age: 47
Discharge: HOME | End: 2022-07-19
Payer: OTHER GOVERNMENT

## 2022-07-19 PROCEDURE — 97110 THERAPEUTIC EXERCISES: CPT

## 2022-07-19 NOTE — PROGRESS NOTES
PT DAILY TREATMENT NOTE 2-15    Patient Name: Kwan Lees  Date:2022  : 1975  [x]  Patient  Verified  Payor: Prema Dejesus / Plan: Martita Antunez / Product Type: Commerical /    In time: 5:00P  Out time: 5:44P  Total Treatment Time (min): 44  Visit #:  3    Treatment Area: Left knee pain [M25.562]    SUBJECTIVE  Pain Level (0-10 scale): 0/10  Any medication changes, allergies to medications, adverse drug reactions, diagnosis change, or new procedure performed?: [x] No    [] Yes (see summary sheet for update)  Subjective functional status/changes:   [] No changes reported  Patient noted they have been feeling \"pretty good,\" since previous treatment session, noted they still have some trouble navigating stairs, \"more worried going up than going down,\". Patient noted some increased soreness in L LE mostly in quad/gluteal musculature but no pain after treatment session. OBJECTIVE      44 min Therapeutic Exercise:  [x] See flow sheet :   Rationale: increase ROM, increase strength and increase proprioception to improve the patients ability to complete ADLs and reduce pain. With   [x] TE   [] TA   [] Neuro   [] SC   [] other: Patient Education: [] Review HEP    [x] Progressed/Changed HEP based on:   [x] positioning   [x] body mechanics   [] transfers   [] heat/ice application    [] other:      Other Objective/Functional Measures: N/A     Pain Level (0-10 scale) post treatment: 0/10    ASSESSMENT/Changes in Function:   Patient tolerated treatment session well today, no increase in pain symptoms, able to progress exercises. Patient demonstrated improving eccentric control today during heel downs w/ micky vs previous treatment session. Patient demonstrated increased L LE ER during side steps and heel downs but was able to correct following verbal/tactile cuing.    Patient will continue to benefit from skilled PT services to modify and progress therapeutic interventions, address functional mobility deficits, address strength deficits, analyze and cue movement patterns and analyze and modify body mechanics/ergonomics to attain remaining goals. [x]  See Plan of Care  []  See progress note/recertification  []  See Discharge Summary         Progress towards goals / Updated goals:  Progressing towards goals.     PLAN  [x]  Upgrade activities as tolerated     [x]  Continue plan of care  [x]  Update interventions per flow sheet       []  Discharge due to:_  []  Other:_      Daniel Doyle, PTA 7/19/2022

## 2022-07-21 ENCOUNTER — APPOINTMENT (OUTPATIENT)
Dept: PHYSICAL THERAPY | Age: 47
End: 2022-07-21
Payer: OTHER GOVERNMENT

## 2022-08-03 ENCOUNTER — HOSPITAL ENCOUNTER (OUTPATIENT)
Dept: PHYSICAL THERAPY | Age: 47
Discharge: HOME | End: 2022-08-03
Payer: OTHER GOVERNMENT

## 2022-08-03 PROCEDURE — 97110 THERAPEUTIC EXERCISES: CPT | Performed by: PHYSICAL THERAPIST

## 2022-08-03 NOTE — PROGRESS NOTES
PT DAILY TREATMENT NOTE 2-15    Patient Name: Remedios Longest  Date:8/3/2022  : 1975  [x]  Patient  Verified  Payor: Samira Loya / Plan: Elvera Fleischer / Product Type: Commerical /    In time: 5:00P  Out time: 5:44P  Total Treatment Time (min): 44  Visit #:  4    Treatment Area: Left knee pain [M25.562]    SUBJECTIVE  Pain Level (0-10 scale): 0/10  Any medication changes, allergies to medications, adverse drug reactions, diagnosis change, or new procedure performed?: [x] No    [] Yes (see summary sheet for update)  Subjective functional status/changes:   [] No changes reported  Patient noted they have been feeling \"pretty good,\" since previous treatment session, noted they still have some trouble navigating stairs, \"more worried going up than going down,\". Patient noted some increased soreness in L LE mostly in quad/gluteal musculature but no pain after treatment session. OBJECTIVE      44 min Therapeutic Exercise:  [x] See flow sheet :   Rationale: increase ROM, increase strength and increase proprioception to improve the patients ability to complete ADLs and reduce pain. With   [x] TE   [] TA   [] Neuro   [] SC   [] other: Patient Education: [] Review HEP    [x] Progressed/Changed HEP based on:   [x] positioning   [x] body mechanics   [] transfers   [] heat/ice application    [] other:      Other Objective/Functional Measures: N/A     Pain Level (0-10 scale) post treatment: 0/10    ASSESSMENT/Changes in Function:   Pt is able to complete all activity with minimal discomfort. She needs cues to complete there-ex accurately. Patient will continue to benefit from skilled PT services to modify and progress therapeutic interventions, address functional mobility deficits, address strength deficits, analyze and cue movement patterns and analyze and modify body mechanics/ergonomics to attain remaining goals.      [x]  See Plan of Care  []  See progress note/recertification  []  See Discharge Summary Progress towards goals / Updated goals:  Progressing towards goals.     PLAN  [x]  Upgrade activities as tolerated     [x]  Continue plan of care  [x]  Update interventions per flow sheet       []  Discharge due to:_  []  Other:_      Leela Dixon, PT 8/3/2022

## 2022-08-05 ENCOUNTER — APPOINTMENT (OUTPATIENT)
Dept: PHYSICAL THERAPY | Age: 47
End: 2022-08-05
Payer: OTHER GOVERNMENT

## 2022-08-10 ENCOUNTER — APPOINTMENT (OUTPATIENT)
Dept: PHYSICAL THERAPY | Age: 47
End: 2022-08-10
Payer: OTHER GOVERNMENT

## 2022-08-22 ENCOUNTER — APPOINTMENT (OUTPATIENT)
Dept: PHYSICAL THERAPY | Age: 47
End: 2022-08-22
Payer: OTHER GOVERNMENT

## 2022-08-31 ENCOUNTER — OFFICE VISIT (OUTPATIENT)
Dept: ORTHOPEDIC SURGERY | Age: 47
End: 2022-08-31
Payer: OTHER GOVERNMENT

## 2022-08-31 VITALS
BODY MASS INDEX: 31.82 KG/M2 | TEMPERATURE: 98.5 F | DIASTOLIC BLOOD PRESSURE: 70 MMHG | HEIGHT: 66 IN | SYSTOLIC BLOOD PRESSURE: 109 MMHG | OXYGEN SATURATION: 100 % | HEART RATE: 63 BPM | WEIGHT: 198 LBS

## 2022-08-31 DIAGNOSIS — M76.52 PATELLAR TENDINITIS, LEFT KNEE: Primary | ICD-10-CM

## 2022-08-31 PROCEDURE — 99212 OFFICE O/P EST SF 10 MIN: CPT | Performed by: ORTHOPAEDIC SURGERY

## 2022-08-31 NOTE — PROGRESS NOTES
Identified pt with two pt identifiers (name and ). Reviewed chart in preparation for visit and have obtained necessary documentation. Urbano Beck is a 49 y.o. female  Chief Complaint   Patient presents with    Follow-up     LT Knee     Visit Vitals  /70 (BP 1 Location: Right arm, BP Patient Position: Sitting, BP Cuff Size: Large adult)   Pulse 63   Temp 98.5 °F (36.9 °C) (Tympanic)   Ht 5' 6\" (1.676 m)   Wt 198 lb (89.8 kg)   SpO2 100%   BMI 31.96 kg/m²     1. Have you been to the ER, urgent care clinic since your last visit? Hospitalized since your last visit? No    2. Have you seen or consulted any other health care providers outside of the [address] [place] [address] since your last visit? Include any pap smears or colon screening.  No

## 2022-08-31 NOTE — Clinical Note
8/31/2022    Patient: Flora Ingram   YOB: 1975   Date of Visit: 8/31/2022     Joselyn Rodriguez NP  Via     Dear Joselyn Rodriguez NP,      Thank you for referring Ms. Jack Dickson to [facility] for evaluation. My notes for this consultation are attached. If you have questions, please do not hesitate to call me. I look forward to following your patient along with you.       Sincerely,    Gian Boyd, DO

## 2022-08-31 NOTE — PROGRESS NOTES
8/31/2022      CC: left knee pain    HPI:      This is a 49y.o. year old female who presents for a follow up visit. The patient was last seen and diagnosed with left knee patellar tendinitis. The patient's treatments since the most recent visit have comprised of PT, though she has not done it daily. The patient has had moderate relief of the chief complaint. PMH:  Past Medical History:   Diagnosis Date    History of shingles     MS (multiple sclerosis) ([facility] [address]        PSxHx:  Past Surgical History:   Procedure Laterality Date    HX HYSTERECTOMY  02/2016    left ovaries    HX ORTHOPAEDIC  2001, 2003    bilat feet, bunions removed, bones removed from pinky       Meds:    Current Outpatient Medications:     ibuprofen (MOTRIN) 400 mg tablet, Take 1 Tablet by mouth every eight (8) hours as needed for Pain. Indications: pain, Disp: 30 Tablet, Rfl: 0    cetirizine (ZYRTEC) 10 mg tablet, Take 1 Tablet by mouth nightly., Disp: 30 Tablet, Rfl: 0    ocrelizumab (OCREVUS IV), by IntraVENous route., Disp: , Rfl:     All:  Allergies   Allergen Reactions    Toradol [Ketorolac] Other (comments)     Only Toradol, uses ibuprofen regularly with no complications or side effects.        Social Hx:  Social History     Socioeconomic History    Marital status:    Tobacco Use    Smoking status: Never    Smokeless tobacco: Never   Vaping Use    Vaping Use: Never used   Substance and Sexual Activity    Alcohol use: No    Drug use: No    Sexual activity: Not Currently     Social Determinants of Health     Physical Activity: Insufficiently Active    Days of Exercise per Week: 6 days    Minutes of Exercise per Session: 20 min       Family Hx:  Family History   Problem Relation Age of Onset    Hypertension Mother     Elevated Lipids Mother     Diabetes Paternal Grandmother     Diabetes Paternal Grandfather          Review of Systems:       General: Denies headache, lethargy, fever, weight loss  Ears/Nose/Throat: Denies ear discharge, drainage, nosebleeds, hoarse voice, dental problems  Cardiovascular: Denies chest pain, shortness of breath  Lungs: Denies chest pain, breathing problems, wheezing, pneumonia  Stomach: Denies stomach pain, heartburn, constipation, irritable bowel  Skin: Denies rash, sores, open wounds  Musculoskeletal: mild left knee pain  Genitourinary: Denies dysuria, hematuria, polyuria  Gastrointestinal: Denies constipation, obstipation, diarrhea  Neurological: Denies changes in sight, smell, hearing, taste, seizures. Denies loss of consciousness. Psychiatric: Denies depression, sleep pattern changes, anxiety, change in personality  Endocrine: Denies mood swings, heat or cold intolerance  Hematologic/Lymphatic: Denies anemia, purpura, petechia  Allergic/Immunologic: Denies swelling of throat, pain or swelling at lymph nodes      Physical Examination:    Visit Vitals  /70 (BP 1 Location: Right arm, BP Patient Position: Sitting, BP Cuff Size: Large adult)   Pulse 63   Temp 98.5 °F (36.9 °C) (Tympanic)   Ht 5' 6\" (1.676 m)   Wt 198 lb (89.8 kg)   SpO2 100%   BMI 31.96 kg/m²        General: AOX3, no apparent distress  Psychiatric: mood and affect appropriate  Lungs: breathing is symmetric and unlabored bilaterally  Heart: regular rate and rhythm  Abdomen: no guarding  Head: normocephalic, atraumatic  Skin: No significant abnormalities, good turgor  Sensation intact to light touch: C5-T1 dermatomes  Muscular exam: 5/5 strength in all major muscle groups unless noted in specialty exam.    Extremities        Left lower extremity:  No gross deformity. No restriction to range of motion of the hip, knee, ankle. Muscle bulk is appropriate without wasting. Sensation is intact to light touch in the L1-S1 dermatomes. Capillary refill is less than 2 seconds in the fingers. Strength testing is 5/5 at the major muscle groups of the hip, knee, ankle. Right lower extremity: No gross deformity.   No restriction to range of motion of the hip, knee, ankle. Muscle bulk is appropriate without wasting. Sensation is intact to light touch in the L1-S1 dermatomes. Capillary refill is less than 2 seconds in the fingers. Strength testing is 5/5 at the major muscle groups of the hip, knee, ankle. Diagnostics:    Pertinent Diagnostics: none today    Assessment: resolving left knee patellar tendinitis  Plan: This patient is doing well and we will proceed with continued home PT, I have emphasized that she should continue this regularly at least for another month. She has deferred medications at this time. Follow up prn. Ms. Megan Prescott has a reminder for a \"due or due soon\" health maintenance. I have asked that she contact her primary care provider for follow-up on this health maintenance.

## 2023-05-20 RX ORDER — CETIRIZINE HYDROCHLORIDE 10 MG/1
1 TABLET ORAL NIGHTLY
COMMUNITY
Start: 2021-10-20

## 2023-05-20 RX ORDER — IBUPROFEN 400 MG/1
400 TABLET ORAL EVERY 8 HOURS PRN
COMMUNITY
Start: 2022-05-09